# Patient Record
Sex: FEMALE | Race: BLACK OR AFRICAN AMERICAN | NOT HISPANIC OR LATINO | Employment: UNEMPLOYED | URBAN - METROPOLITAN AREA
[De-identification: names, ages, dates, MRNs, and addresses within clinical notes are randomized per-mention and may not be internally consistent; named-entity substitution may affect disease eponyms.]

---

## 2017-05-30 PROBLEM — R29.898 UPPER EXTREMITY WEAKNESS: Status: ACTIVE | Noted: 2017-05-30

## 2018-12-10 PROBLEM — K59.04 CHRONIC IDIOPATHIC CONSTIPATION: Status: ACTIVE | Noted: 2018-12-10

## 2020-12-31 PROBLEM — E11.9 TYPE 2 DIABETES MELLITUS WITHOUT COMPLICATION, WITHOUT LONG-TERM CURRENT USE OF INSULIN: Status: ACTIVE | Noted: 2020-12-31

## 2021-03-29 PROBLEM — Z86.010 PERSONAL HISTORY OF COLONIC POLYPS: Status: ACTIVE | Noted: 2021-03-29

## 2021-03-29 PROBLEM — Z86.0100 PERSONAL HISTORY OF COLONIC POLYPS: Status: ACTIVE | Noted: 2021-03-29

## 2023-12-28 ENCOUNTER — PATIENT MESSAGE (OUTPATIENT)
Dept: FAMILY MEDICINE | Facility: CLINIC | Age: 63
End: 2023-12-28
Payer: COMMERCIAL

## 2023-12-28 DIAGNOSIS — G56.02 CARPAL TUNNEL SYNDROME OF LEFT WRIST: Primary | ICD-10-CM

## 2023-12-28 DIAGNOSIS — G56.01 CARPAL TUNNEL SYNDROME OF RIGHT WRIST: ICD-10-CM

## 2024-01-16 ENCOUNTER — OFFICE VISIT (OUTPATIENT)
Dept: FAMILY MEDICINE | Facility: CLINIC | Age: 64
End: 2024-01-16
Payer: COMMERCIAL

## 2024-01-16 DIAGNOSIS — Z91.09 ENVIRONMENTAL ALLERGIES: ICD-10-CM

## 2024-01-16 DIAGNOSIS — E78.2 MIXED HYPERLIPIDEMIA: ICD-10-CM

## 2024-01-16 DIAGNOSIS — J32.9 RECURRENT SINUSITIS: ICD-10-CM

## 2024-01-16 DIAGNOSIS — K59.04 CHRONIC IDIOPATHIC CONSTIPATION: ICD-10-CM

## 2024-01-16 DIAGNOSIS — E11.65 TYPE 2 DIABETES MELLITUS WITH HYPERGLYCEMIA, WITHOUT LONG-TERM CURRENT USE OF INSULIN: Primary | ICD-10-CM

## 2024-01-16 DIAGNOSIS — R60.0 BILATERAL LOWER EXTREMITY EDEMA: ICD-10-CM

## 2024-01-16 DIAGNOSIS — I10 ESSENTIAL HYPERTENSION: ICD-10-CM

## 2024-01-16 DIAGNOSIS — R22.1 LOCALIZED SWELLING, MASS OR LUMP OF NECK: ICD-10-CM

## 2024-01-16 PROCEDURE — 3008F BODY MASS INDEX DOCD: CPT | Mod: CPTII,95,, | Performed by: NURSE PRACTITIONER

## 2024-01-16 PROCEDURE — 1159F MED LIST DOCD IN RCRD: CPT | Mod: CPTII,95,, | Performed by: NURSE PRACTITIONER

## 2024-01-16 PROCEDURE — 99214 OFFICE O/P EST MOD 30 MIN: CPT | Mod: 95,,, | Performed by: NURSE PRACTITIONER

## 2024-01-16 PROCEDURE — 1160F RVW MEDS BY RX/DR IN RCRD: CPT | Mod: CPTII,95,, | Performed by: NURSE PRACTITIONER

## 2024-01-16 RX ORDER — TIRZEPATIDE 5 MG/.5ML
5 INJECTION, SOLUTION SUBCUTANEOUS
Qty: 4 PEN | Refills: 2 | Status: SHIPPED | OUTPATIENT
Start: 2024-01-16 | End: 2024-04-15

## 2024-01-16 RX ORDER — AMOXICILLIN AND CLAVULANATE POTASSIUM 875; 125 MG/1; MG/1
1 TABLET, FILM COATED ORAL EVERY 12 HOURS
Qty: 14 TABLET | Refills: 0 | Status: SHIPPED | OUTPATIENT
Start: 2024-01-16 | End: 2024-04-15 | Stop reason: ALTCHOICE

## 2024-01-16 RX ORDER — HYDROCHLOROTHIAZIDE 25 MG/1
25 TABLET ORAL DAILY
Qty: 90 TABLET | Refills: 1 | Status: SHIPPED | OUTPATIENT
Start: 2024-01-16

## 2024-01-16 RX ORDER — FLUTICASONE PROPIONATE 50 MCG
SPRAY, SUSPENSION (ML) NASAL
Qty: 16 G | Refills: 5 | Status: SHIPPED | OUTPATIENT
Start: 2024-01-16

## 2024-01-16 RX ORDER — PREDNISONE 20 MG/1
TABLET ORAL
Qty: 13 TABLET | Refills: 0 | Status: SHIPPED | OUTPATIENT
Start: 2024-01-16 | End: 2024-01-26

## 2024-01-16 RX ORDER — AZELASTINE 1 MG/ML
1 SPRAY, METERED NASAL 2 TIMES DAILY
Qty: 30 ML | Refills: 5 | Status: SHIPPED | OUTPATIENT
Start: 2024-01-16

## 2024-01-18 VITALS — WEIGHT: 247 LBS | BODY MASS INDEX: 41.1 KG/M2

## 2024-01-18 NOTE — PROGRESS NOTES
SUBJECTIVE:      Patient ID: Abril Ontiveros is a 63 y.o. female.    Chief Complaint: No chief complaint on file.    Presents via telemed visit for weight recheck & some other concerns - states she has been trying to exercise regularly but was sick through the holidays and hasn't gotten back on track. She is doing well with phentermine for appetite suppression.    Discussed outstanding health maintenance    Diabetes  She presents for her follow-up diabetic visit. She has type 2 diabetes mellitus. No MedicAlert identification noted. The initial diagnosis of diabetes was made 2 years ago. Her disease course has been fluctuating. Hypoglycemia symptoms include dizziness (feels this is sinus related). Pertinent negatives for hypoglycemia include no confusion, headaches, nervousness/anxiousness or pallor. Associated symptoms include fatigue and weakness (extremities). Pertinent negatives for diabetes include no chest pain, no polydipsia and no polyuria. There are no hypoglycemic complications. Symptoms are stable. There are no diabetic complications. Risk factors for coronary artery disease include diabetes mellitus, dyslipidemia, hypertension, obesity, post-menopausal, sedentary lifestyle, family history and stress. Current diabetic treatment includes diet (Mounjaro). She is compliant with treatment all of the time. Her weight is decreasing steadily. She is following a generally healthy diet. Meal planning includes calorie counting and avoidance of concentrated sweets. She has not had a previous visit with a dietitian. She rarely participates in exercise. An ACE inhibitor/angiotensin II receptor blocker is not being taken. She does not see a podiatrist.Eye exam is current.   Hypertension  This is a chronic problem. The current episode started more than 1 month ago. The problem has been gradually improving since onset. The problem is controlled. Associated symptoms include peripheral edema. Pertinent negatives  include no chest pain, headaches, neck pain, palpitations or shortness of breath. Risk factors for coronary artery disease include diabetes mellitus, dyslipidemia, obesity, post-menopausal state, sedentary lifestyle and stress. Past treatments include diuretics. The current treatment provides moderate improvement. Compliance problems include exercise and diet.  Identifiable causes of hypertension include a thyroid problem.   Thyroid Problem  Presents for follow-up visit. Symptoms include constipation, fatigue and hoarse voice. Patient reports no anxiety, cold intolerance, diarrhea, heat intolerance, menstrual problem or palpitations. The symptoms have been stable. Her past medical history is significant for diabetes and hyperlipidemia.   Hyperlipidemia  This is a chronic problem. The current episode started more than 1 year ago. Exacerbating diseases include diabetes, hypothyroidism and obesity. Factors aggravating her hyperlipidemia include fatty foods and thiazides. Pertinent negatives include no chest pain, myalgias or shortness of breath. Current antihyperlipidemic treatment includes diet change and exercise. Compliance problems include adherence to diet and adherence to exercise.  Risk factors for coronary artery disease include diabetes mellitus, dyslipidemia, obesity, a sedentary lifestyle, post-menopausal, family history, hypertension and stress.       Past Surgical History:   Procedure Laterality Date     SECTION      x 2,  &     COLONOSCOPY N/A 3/29/2021    Procedure: COLONOSCOPY;  Surgeon: Jhoan Mendoza MD;  Location: [facility];  Service: Endoscopy;  Laterality: N/A;    CYST REMOVAL      chest area, left and right underarms    HYSTERECTOMY      OOPHORECTOMY       Family History   Problem Relation Age of Onset    Diabetes Mother     Cancer Mother         unknown  type. Hysterectomy.    Thyroid disease Sister     Cancer Father         throat cancer. Alcohol abuse, tobacco use.     Alcohol abuse Father     COPD Brother       Social History     Socioeconomic History    Marital status:     Number of children: 2   Occupational History     Employer: [employer]   Tobacco Use    Smoking status: Never    Smokeless tobacco: Never   Substance and Sexual Activity    Alcohol use: No    Drug use: No     Social Determinants of Health     Financial Resource Strain: Medium Risk (1/12/2024)    Overall Financial Resource Strain (CARDIA)     Difficulty of Paying Living Expenses: Somewhat hard   Food Insecurity: No Food Insecurity (1/12/2024)    Hunger Vital Sign     Worried About Running Out of Food in the Last Year: Never true     Ran Out of Food in the Last Year: Never true   Transportation Needs: No Transportation Needs (1/12/2024)    PRAPARE - Transportation     Lack of Transportation (Medical): No     Lack of Transportation (Non-Medical): No   Physical Activity: Insufficiently Active (1/12/2024)    Exercise Vital Sign     Days of Exercise per Week: 3 days     Minutes of Exercise per Session: 30 min   Stress: Patient Declined (1/12/2024)    [employer] - Occupational Stress Questionnaire     Feeling of Stress : Patient declined   Social Connections: Unknown (1/12/2024)    Social Connection and Isolation Panel [NHANES]     Frequency of Communication with Friends and Family: Twice a week     Frequency of Social Gatherings with Friends and Family: Patient declined     Active Member of Clubs or Organizations: Yes     Attends Club or Organization Meetings: More than 4 times per year     Marital Status:    Housing Stability: Low Risk  (1/12/2024)    Housing Stability Vital Sign     Unable to Pay for Housing in the Last Year: No     Number of Places Lived in the Last Year: 2     Unstable Housing in the Last Year: No     Current Outpatient Medications   Medication Sig Dispense Refill    albuterol (PROVENTIL/VENTOLIN HFA) 90 mcg/actuation inhaler INHALE 2 PUFFS BY MOUTH INTO  THE LUNGS EVERY 6 HOURS AS NEEDED FOR WHEEZING, RESCUE 18 g 0    amoxicillin-clavulanate 875-125mg (AUGMENTIN) 875-125 mg per tablet Take 1 tablet by mouth every 12 (twelve) hours. 14 tablet 0    azelastine (ASTELIN) 137 mcg (0.1 %) nasal spray 1 spray (137 mcg total) by Nasal route 2 (two) times daily. 30 mL 5    doxycycline (VIBRA-TABS) 100 MG tablet Take 1 tablet (100 mg total) by mouth once daily. 90 tablet 0    fluticasone propionate (FLONASE) 50 mcg/actuation nasal spray SHAKE LIQUID AND USE 1 SPRAY IN EACH NOSTRIL TWICE DAILY 16 g 5    furosemide (LASIX) 20 MG tablet TAKE 1 TABLET(20 MG) BY MOUTH EVERY DAY (Patient taking differently: daily as needed.) 30 tablet 0    hydroCHLOROthiazide (HYDRODIURIL) 25 MG tablet Take 1 tablet (25 mg total) by mouth once daily. 90 tablet 1    hydrocortisone 2.5 % cream Apply topically 2 (two) times daily. 28 g 0    linaCLOtide (LINZESS) 290 mcg Cap capsule Take 1 capsule (290 mcg total) by mouth once daily. 90 capsule 1    mupirocin (BACTROBAN) 2 % ointment Apply topically 3 (three) times daily. To nares 15 g 0    mv-mn/folic acid/vit [id number] (ALIVE ONCE DAILY WOMEN 50 PLUS ORAL) Take 1 tablet by mouth once daily.      phentermine (ADIPEX-P) 37.5 mg tablet Take 1 tablet (37.5 mg total) by mouth once daily. 90 tablet 0    predniSONE (DELTASONE) 20 MG tablet Take 2 tablets (40 mg total) by mouth once daily for 4 days, THEN 1 tablet (20 mg total) once daily for 4 days, THEN 0.5 tablets (10 mg total) once daily for 2 days. 13 tablet 0    tirzepatide (MOUNJARO) 5 mg/0.5 mL PnIj Inject 5 mg into the skin every 7 days. 4 Pen 2     No current facility-administered medications for this visit.     Review of patient's allergies indicates:   Allergen Reactions    Bactrim [sulfamethoxazole-trimethoprim] Nausea Only     Bad nausea and stomach cramps    Sulfa (sulfonamide antibiotics) Other (See Comments)     Severe cramping      Past Medical History:   Diagnosis Date    Allergic rhinitis      Bronchitis 2020    Chronic sinusitis     Constipation     Hidradenitis suppurativa     Hyperlipidemia     Hypertension     Meniere disease     Type 2 diabetes mellitus without complication, without long-term current use of insulin 2020    Type 2 diabetes mellitus without retinopathy 2023    EYE EXAM IN MEDIA     Past Surgical History:   Procedure Laterality Date     SECTION      x 2,  &     COLONOSCOPY N/A 3/29/2021    Procedure: COLONOSCOPY;  Surgeon: Jhoan Mendoza MD;  Location: [facility];  Service: Endoscopy;  Laterality: N/A;    CYST REMOVAL      chest area, left and right underarms    HYSTERECTOMY  1983    OOPHORECTOMY         Review of Systems   Constitutional:  Positive for fatigue. Negative for activity change, appetite change and unexpected weight change.   HENT:  Positive for congestion, ear pain, hoarse voice, postnasal drip, sinus pressure, sinus pain and sore throat. Negative for drooling, facial swelling, hearing loss, rhinorrhea, sneezing and trouble swallowing.         Saw Dr. Hdz & was referred to allergist (can't recall name though)   Eyes:  Negative for photophobia, pain, discharge and visual disturbance.   Respiratory:  Negative for cough, chest tightness, shortness of breath and wheezing.    Cardiovascular:  Positive for leg swelling. Negative for chest pain and palpitations.   Gastrointestinal:  Positive for constipation. Negative for abdominal distention, abdominal pain, anal bleeding, blood in stool, diarrhea, nausea and vomiting.   Endocrine: Negative for cold intolerance, heat intolerance, polydipsia and polyuria.   Genitourinary:  Negative for difficulty urinating, dysuria, flank pain, frequency, hematuria, menstrual problem, pelvic pain, urgency and vaginal pain.   Musculoskeletal:  Positive for arthralgias. Negative for back pain, joint swelling, myalgias and neck pain.   Skin:  Negative for pallor and rash.        States she still has a  lump on her neck that changes in size   Allergic/Immunologic: Positive for environmental allergies. Negative for food allergies.   Neurological:  Positive for dizziness (feels this is sinus related), weakness (extremities) and numbness (hands, feet). Negative for light-headedness and headaches.   Hematological:  Does not bruise/bleed easily.   Psychiatric/Behavioral:  Negative for agitation, confusion, decreased concentration, dysphoric mood and sleep disturbance. The patient is not nervous/anxious.       OBJECTIVE:      Vitals:    01/16/24 1200   Weight: 112 kg (247 lb)       Physical Exam  Constitutional:       General: She is not in acute distress.     Appearance: Normal appearance. She is well-developed. She is obese.   HENT:      Head: Normocephalic and atraumatic.      Nose: Nose normal.   Eyes:      General: Lids are normal.   Neck:      Trachea: No tracheal deviation.   Pulmonary:      Effort: Pulmonary effort is normal. No respiratory distress.   Musculoskeletal:      Cervical back: Normal range of motion.   Skin:     Coloration: Skin is not pale.   Neurological:      Mental Status: She is alert and oriented to person, place, and time.      GCS: GCS eye subscore is 4. GCS verbal subscore is 5. GCS motor subscore is 6.   Psychiatric:         Attention and Perception: Attention and perception normal.         Mood and Affect: Mood and affect normal.         Speech: Speech normal.         Behavior: Behavior normal.         Thought Content: Thought content normal.         Cognition and Memory: Cognition and memory normal.        Assessment:       1. Type 2 diabetes mellitus with hyperglycemia, without long-term current use of insulin    2. Essential hypertension    3. Recurrent sinusitis    4. Chronic idiopathic constipation    5. Localized swelling, mass or lump of neck    6. Bilateral lower extremity edema    7. Environmental allergies    8. Mixed hyperlipidemia        Plan:       Type 2 diabetes mellitus with  hyperglycemia, without long-term current use of insulin  -     CBC Auto Differential; Future; Expected date: 01/23/2024  -     Comprehensive Metabolic Panel; Future; Expected date: 01/16/2024  -     Lipid Panel; Future; Expected date: 01/16/2024  -     TSH; Future; Expected date: 01/16/2024  -     Hemoglobin A1C; Future; Expected date: 01/16/2024  -     Microalbumin/Creatinine Ratio, Urine; Future; Expected date: 01/16/2024  -     T4, Free; Future; Expected date: 01/16/2024  -     tirzepatide (MOUNJARO) 5 mg/0.5 mL PnIj; Inject 5 mg into the skin every 7 days.  Dispense: 4 Pen; Refill: 2    Essential hypertension  -     CBC Auto Differential; Future; Expected date: 01/23/2024  -     Comprehensive Metabolic Panel; Future; Expected date: 01/16/2024  -     Lipid Panel; Future; Expected date: 01/16/2024  -     TSH; Future; Expected date: 01/16/2024    Recurrent sinusitis  -     CT Sinuses without Contrast; Future; Expected date: 01/16/2024  -     CBC Auto Differential; Future; Expected date: 01/23/2024  -     amoxicillin-clavulanate 875-125mg (AUGMENTIN) 875-125 mg per tablet; Take 1 tablet by mouth every 12 (twelve) hours.  Dispense: 14 tablet; Refill: 0  -     predniSONE (DELTASONE) 20 MG tablet; Take 2 tablets (40 mg total) by mouth once daily for 4 days, THEN 1 tablet (20 mg total) once daily for 4 days, THEN 0.5 tablets (10 mg total) once daily for 2 days.  Dispense: 13 tablet; Refill: 0    Chronic idiopathic constipation  -     linaCLOtide (LINZESS) 290 mcg Cap capsule; Take 1 capsule (290 mcg total) by mouth once daily.  Dispense: 90 capsule; Refill: 1    Localized swelling, mass or lump of neck  -     US Soft Tissue Head Neck Thyroid; Future; Expected date: 01/16/2024  -     CBC Auto Differential; Future; Expected date: 01/23/2024  -     TSH; Future; Expected date: 01/16/2024  -     T4, Free; Future; Expected date: 01/16/2024    Bilateral lower extremity edema  -     hydroCHLOROthiazide (HYDRODIURIL) 25 MG  tablet; Take 1 tablet (25 mg total) by mouth once daily.  Dispense: 90 tablet; Refill: 1  -     Comprehensive Metabolic Panel; Future; Expected date: 01/16/2024    Environmental allergies  -     fluticasone propionate (FLONASE) 50 mcg/actuation nasal spray; SHAKE LIQUID AND USE 1 SPRAY IN EACH NOSTRIL TWICE DAILY  Dispense: 16 g; Refill: 5  -     azelastine (ASTELIN) 137 mcg (0.1 %) nasal spray; 1 spray (137 mcg total) by Nasal route 2 (two) times daily.  Dispense: 30 mL; Refill: 5    Mixed hyperlipidemia  -     Comprehensive Metabolic Panel; Future; Expected date: 01/16/2024  -     Lipid Panel; Future; Expected date: 01/16/2024  -     TSH; Future; Expected date: 01/16/2024                    Follow up in about 6 months (around 7/16/2024) for well exam & lab review.      1/18/2024 AARON Arreguin, FNP-C

## 2024-01-30 ENCOUNTER — HOSPITAL ENCOUNTER (OUTPATIENT)
Dept: RADIOLOGY | Facility: HOSPITAL | Age: 64
Discharge: HOME | End: 2024-01-30
Attending: NURSE PRACTITIONER
Payer: MEDICARE

## 2024-01-30 DIAGNOSIS — J32.9 RECURRENT SINUSITIS: ICD-10-CM

## 2024-01-30 DIAGNOSIS — R22.1 LOCALIZED SWELLING, MASS OR LUMP OF NECK: ICD-10-CM

## 2024-01-30 PROCEDURE — 70486 CT MAXILLOFACIAL W/O DYE: CPT | Mod: TC,PO

## 2024-01-30 PROCEDURE — 76536 US EXAM OF HEAD AND NECK: CPT | Mod: TC,PO

## 2024-02-12 ENCOUNTER — PATIENT MESSAGE (OUTPATIENT)
Dept: FAMILY MEDICINE | Facility: CLINIC | Age: 64
End: 2024-02-12
Payer: COMMERCIAL

## 2024-03-05 DIAGNOSIS — E04.1 LEFT THYROID NODULE: Primary | ICD-10-CM

## 2024-03-20 DIAGNOSIS — J34.89 SORE IN NOSE: ICD-10-CM

## 2024-03-20 DIAGNOSIS — K64.9 HEMORRHOIDS, UNSPECIFIED HEMORRHOID TYPE: ICD-10-CM

## 2024-03-21 RX ORDER — HYDROCORTISONE 25 MG/G
CREAM TOPICAL 2 TIMES DAILY
Qty: 28 G | Refills: 0 | Status: SHIPPED | OUTPATIENT
Start: 2024-03-21

## 2024-03-21 RX ORDER — MUPIROCIN 20 MG/G
OINTMENT TOPICAL 3 TIMES DAILY
Qty: 15 G | Refills: 0 | Status: SHIPPED | OUTPATIENT
Start: 2024-03-21 | End: 2024-06-11 | Stop reason: ALTCHOICE

## 2024-03-21 NOTE — TELEPHONE ENCOUNTER
Patient last seen: 01/16/24  Scheduled to be seen: 04/15/24  Medication last filled: 10/10/23    Medication pended

## 2024-03-26 ENCOUNTER — OFFICE VISIT (OUTPATIENT)
Dept: SURGERY | Facility: CLINIC | Age: 64
End: 2024-03-26
Payer: COMMERCIAL

## 2024-03-26 VITALS — DIASTOLIC BLOOD PRESSURE: 89 MMHG | SYSTOLIC BLOOD PRESSURE: 133 MMHG | TEMPERATURE: 99 F

## 2024-03-26 DIAGNOSIS — E04.1 LEFT THYROID NODULE: Primary | ICD-10-CM

## 2024-03-26 DIAGNOSIS — E04.2 MULTINODULAR GOITER: ICD-10-CM

## 2024-03-26 PROCEDURE — 3075F SYST BP GE 130 - 139MM HG: CPT | Mod: CPTII,S$GLB,, | Performed by: SURGERY

## 2024-03-26 PROCEDURE — 1160F RVW MEDS BY RX/DR IN RCRD: CPT | Mod: CPTII,S$GLB,, | Performed by: SURGERY

## 2024-03-26 PROCEDURE — 99999 PR PBB SHADOW E&M-EST. PATIENT-LVL III: CPT | Mod: PBBFAC,,, | Performed by: SURGERY

## 2024-03-26 PROCEDURE — 1159F MED LIST DOCD IN RCRD: CPT | Mod: CPTII,S$GLB,, | Performed by: SURGERY

## 2024-03-26 PROCEDURE — 99204 OFFICE O/P NEW MOD 45 MIN: CPT | Mod: S$GLB,,, | Performed by: SURGERY

## 2024-03-26 PROCEDURE — 3079F DIAST BP 80-89 MM HG: CPT | Mod: CPTII,S$GLB,, | Performed by: SURGERY

## 2024-03-26 NOTE — PROGRESS NOTES
Subjective:       Patient ID: Abril Ontiveros is a 63 y.o. female.    Chief Complaint: Other (Thyroid nodule)      HPI:  63 year old female referred to the office with large left thyroid nodule. Visible externally. Has left neck tenderness. Nodule nearly 6 cm in the left. She has a 2.2 cm nodule on the right. No history of radiation.     US FINDINGS:  Right thyroid lobe measures 50 x 20 x 20 mm, and left thyroid lobe measures 79 x 34 x 29 mm. Thyroid isthmus thickness is 14 mm.     6 mm hypoechoic nodule lies in superior right thyroid lobe.  8 mm hypoechoic solid nodule in mid thyroid lobe posteriorly contains punctate echogenic foci.  Hypoechoic nodule in right thyroid lobe superiorly and medially measures 7 mm.  Mid right thyroid lobe solid hypoechoic nodule measures 7 mm.  Inferior right thyroid lobe solid hypoechoic nodule measures 10 mm.  Inferior right thyroid lobe hypoechoic solid nodule measures 22 x 16 x 12 mm.     Solid heterogeneous nodule in left thyroid lobe measures 58 x 44 x 38 mm.     Targeted ultrasound in left neck, at site of reported abnormality, shows 3 oh hypoechoic foci measuring less than 10 mm short axis diameter size, likely reflecting lymph nodes. Evaluation of right neck also shows hypoechoic focus measuring 3 mm in short axis diameter are felt to represent a lymph node.     IMPRESSION:     1. Multifocal bilateral thyroid nodules including 58 mm solid nodule in left thyroid lobe. This represents a TI-RADS 5 lesion and, given size, ultrasound-guided FNA is recommended.  2. In the neck bilaterally, oval hypoechoic foci suggesting normal size cervical lymph nodes are present. Clinical correlation is requested to document stability or resolution. If any progression occurs, further evaluation with CT soft tissue neck with IV contrast is recommended.       Past Medical History:   Diagnosis Date    Allergic rhinitis     Bronchitis 12/02/2020    Chronic sinusitis     Constipation      Hidradenitis suppurativa     Hyperlipidemia     Hypertension     Meniere disease     Type 2 diabetes mellitus without complication, without long-term current use of insulin 2020    Type 2 diabetes mellitus without retinopathy 2023    EYE EXAM IN MEDIA     Past Surgical History:   Procedure Laterality Date     SECTION      x 2,  &     COLONOSCOPY N/A 3/29/2021    Procedure: COLONOSCOPY;  Surgeon: Jhoan Mendoza MD;  Location: [facility];  Service: Endoscopy;  Laterality: N/A;    CYST REMOVAL      chest area, left and right underarms    HYSTERECTOMY  1983    OOPHORECTOMY       Review of patient's allergies indicates:   Allergen Reactions    Bactrim [sulfamethoxazole-trimethoprim] Nausea Only     Bad nausea and stomach cramps    Sulfa (sulfonamide antibiotics) Other (See Comments)     Severe cramping     Medication List with Changes/Refills   Current Medications    ALBUTEROL (PROVENTIL/VENTOLIN HFA) 90 MCG/ACTUATION INHALER    INHALE 2 PUFFS BY MOUTH INTO THE LUNGS EVERY 6 HOURS AS NEEDED FOR WHEEZING, RESCUE    AMOXICILLIN-CLAVULANATE 875-125MG (AUGMENTIN) 875-125 MG PER TABLET    Take 1 tablet by mouth every 12 (twelve) hours.    AZELASTINE (ASTELIN) 137 MCG (0.1 %) NASAL SPRAY    1 spray (137 mcg total) by Nasal route 2 (two) times daily.    DOXYCYCLINE (VIBRA-TABS) 100 MG TABLET    Take 1 tablet (100 mg total) by mouth once daily.    FLUTICASONE PROPIONATE (FLONASE) 50 MCG/ACTUATION NASAL SPRAY    SHAKE LIQUID AND USE 1 SPRAY IN EACH NOSTRIL TWICE DAILY    FUROSEMIDE (LASIX) 20 MG TABLET    TAKE 1 TABLET(20 MG) BY MOUTH EVERY DAY    HYDROCHLOROTHIAZIDE (HYDRODIURIL) 25 MG TABLET    Take 1 tablet (25 mg total) by mouth once daily.    HYDROCORTISONE 2.5 % CREAM    Apply topically 2 (two) times daily.    LINACLOTIDE (LINZESS) 290 MCG CAP CAPSULE    Take 1 capsule (290 mcg total) by mouth once daily.    MUPIROCIN (BACTROBAN) 2 % OINTMENT    Apply topically 3 (three) times  daily. To margarito    MV-MN/FOLIC ACID/VIT [id number] (ALIVE ONCE DAILY WOMEN 50 PLUS ORAL)    Take 1 tablet by mouth once daily.    PHENTERMINE (ADIPEX-P) 37.5 MG TABLET    Take 1 tablet (37.5 mg total) by mouth once daily.    TIRZEPATIDE (MOUNJARO) 5 MG/0.5 ML PNIJ    Inject 5 mg into the skin every 7 days.     Family History   Problem Relation Age of Onset    Diabetes Mother     Cancer Mother         unknown  type. Hysterectomy.    Thyroid disease Sister     Cancer Father         throat cancer. Alcohol abuse, tobacco use.    Alcohol abuse Father     COPD Brother      Social History     Socioeconomic History    Marital status:     Number of children: 2   Occupational History     Employer: [employer]   Tobacco Use    Smoking status: Never    Smokeless tobacco: Never   Substance and Sexual Activity    Alcohol use: No    Drug use: No     Social Determinants of Health     Financial Resource Strain: Medium Risk (1/12/2024)    Overall Financial Resource Strain (CARDIA)     Difficulty of Paying Living Expenses: Somewhat hard   Food Insecurity: No Food Insecurity (1/12/2024)    Hunger Vital Sign     Worried About Running Out of Food in the Last Year: Never true     Ran Out of Food in the Last Year: Never true   Transportation Needs: No Transportation Needs (1/12/2024)    PRAPARE - Transportation     Lack of Transportation (Medical): No     Lack of Transportation (Non-Medical): No   Physical Activity: Insufficiently Active (1/12/2024)    Exercise Vital Sign     Days of Exercise per Week: 3 days     Minutes of Exercise per Session: 30 min   Stress: Patient Declined (1/12/2024)    [employer] - Occupational Stress Questionnaire     Feeling of Stress : Patient declined   Social Connections: Unknown (1/12/2024)    Social Connection and Isolation Panel [NHANES]     Frequency of Communication with Friends and Family: Twice a week     Frequency of Social Gatherings with Friends  and Family: Patient declined     Active Member of Clubs or Organizations: Yes     Attends Club or Organization Meetings: More than 4 times per year     Marital Status:    Housing Stability: Low Risk  (1/12/2024)    Housing Stability Vital Sign     Unable to Pay for Housing in the Last Year: No     Number of Places Lived in the Last Year: 2     Unstable Housing in the Last Year: No         Review of Systems   Constitutional:  Negative for appetite change, chills, fever and unexpected weight change.   HENT:  Positive for sore throat. Negative for hearing loss, rhinorrhea and voice change.    Eyes:  Negative for photophobia and visual disturbance.   Respiratory:  Negative for cough, choking and shortness of breath.    Cardiovascular:  Negative for chest pain, palpitations and leg swelling.   Gastrointestinal:  Negative for abdominal pain, blood in stool, constipation, diarrhea, nausea and vomiting.   Endocrine: Negative for cold intolerance, heat intolerance, polydipsia and polyuria.   Musculoskeletal:  Negative for arthralgias, back pain, joint swelling and neck stiffness.   Skin:  Negative for color change, pallor and rash.   Neurological:  Negative for dizziness, seizures, syncope and headaches.   Hematological:  Negative for adenopathy. Does not bruise/bleed easily.   Psychiatric/Behavioral:  Negative for agitation, behavioral problems and confusion.      Objective:      Physical Exam  Constitutional:       General: She is awake. She is not in acute distress.     Appearance: She is not toxic-appearing.   HENT:      Head: Normocephalic and atraumatic.   Neck:      Thyroid: Thyroid mass, thyromegaly and thyroid tenderness present.     Pulmonary:      Effort: Pulmonary effort is normal. No tachypnea, bradypnea, accessory muscle usage or respiratory distress.   Abdominal:      General: There is no distension.      Palpations: Abdomen is soft.      Tenderness: There is no abdominal tenderness.    Lymphadenopathy:      Cervical: No cervical adenopathy.      Right cervical: No superficial or deep cervical adenopathy.     Left cervical: No superficial or deep cervical adenopathy.   Neurological:      Mental Status: She is alert and oriented to person, place, and time.   Psychiatric:         Behavior: Behavior is cooperative.       Assessment/Plan:   Left thyroid nodule  -     Ambulatory referral/consult to General Surgery  -     US FNA Thyroid, 1st Lesion; Future; Expected date: 03/26/2024    Multinodular goiter  -     US FNA Thyroid Ea Addl Lesion; Future; Expected date: 03/26/2024    Will refer for FNA of the large left nodule the 2.2 cm right thyroid nodule. Plan for left thyroid lobectomy as it is large, tender. Possible total thyroidectomy if left nodule is suspicious and/or right nodule is suspicious.     Follow up after FNA

## 2024-03-27 ENCOUNTER — PATIENT MESSAGE (OUTPATIENT)
Dept: SURGERY | Facility: CLINIC | Age: 64
End: 2024-03-27
Payer: COMMERCIAL

## 2024-04-03 ENCOUNTER — TELEPHONE (OUTPATIENT)
Dept: RADIOLOGY | Facility: HOSPITAL | Age: 64
End: 2024-04-03

## 2024-04-03 NOTE — NURSING
FNA mahin thyroid scheduled @ [facility] main on 4/11 @ 1pm with arrival @ 1230. Pre-procedure instructions given and understanding verbalized.

## 2024-04-12 ENCOUNTER — HOSPITAL ENCOUNTER (OUTPATIENT)
Dept: RADIOLOGY | Facility: HOSPITAL | Age: 64
Discharge: HOME | End: 2024-04-12
Attending: SURGERY
Payer: COMMERCIAL

## 2024-04-12 DIAGNOSIS — E04.2 MULTINODULAR GOITER: ICD-10-CM

## 2024-04-12 DIAGNOSIS — E04.1 LEFT THYROID NODULE: ICD-10-CM

## 2024-04-12 PROCEDURE — 10006 FNA BX W/US GDN EA ADDL: CPT | Mod: ,,, | Performed by: RADIOLOGY

## 2024-04-12 PROCEDURE — 10005 FNA BX W/US GDN 1ST LES: CPT

## 2024-04-12 PROCEDURE — 25000003 PHARM REV CODE 250: Performed by: RADIOLOGY

## 2024-04-12 PROCEDURE — 10006 FNA BX W/US GDN EA ADDL: CPT

## 2024-04-12 PROCEDURE — 27000342 US FINE NEEDLE ASPIRATION THYROID, FIRST LESION

## 2024-04-12 PROCEDURE — 88173 CYTOPATH EVAL FNA REPORT: CPT | Performed by: SURGERY

## 2024-04-12 PROCEDURE — 10005 FNA BX W/US GDN 1ST LES: CPT | Mod: ,,, | Performed by: RADIOLOGY

## 2024-04-12 RX ORDER — LIDOCAINE HYDROCHLORIDE 10 MG/ML
INJECTION, SOLUTION EPIDURAL; INFILTRATION; INTRACAUDAL; PERINEURAL
Status: COMPLETED | OUTPATIENT
Start: 2024-04-12 | End: 2024-04-12

## 2024-04-12 RX ADMIN — LIDOCAINE HYDROCHLORIDE 15 ML: 10 INJECTION, SOLUTION EPIDURAL; INFILTRATION; INTRACAUDAL; PERINEURAL at 09:04

## 2024-04-15 ENCOUNTER — OFFICE VISIT (OUTPATIENT)
Dept: FAMILY MEDICINE | Facility: CLINIC | Age: 64
End: 2024-04-15
Payer: COMMERCIAL

## 2024-04-15 ENCOUNTER — PATIENT MESSAGE (OUTPATIENT)
Dept: FAMILY MEDICINE | Facility: CLINIC | Age: 64
End: 2024-04-15

## 2024-04-15 DIAGNOSIS — L73.2 HYDRADENITIS: ICD-10-CM

## 2024-04-15 DIAGNOSIS — E11.65 TYPE 2 DIABETES MELLITUS WITH HYPERGLYCEMIA, WITHOUT LONG-TERM CURRENT USE OF INSULIN: ICD-10-CM

## 2024-04-15 LAB
CYTOLOGY TISS FNA DOC CYTO: NORMAL
CYTOLOGY TISS FNA DOC CYTO: NORMAL
DX ICD CODE: NORMAL
DX ICD CODE: NORMAL
FNA PERFORMED BY: NORMAL
FNA PERFORMED BY: NORMAL
PATH REPORT.SITE OF ORIGIN SPEC: NORMAL
PATH REPORT.SITE OF ORIGIN SPEC: NORMAL
PATHOLOGIST NAME: NORMAL
PATHOLOGIST NAME: NORMAL

## 2024-04-15 PROCEDURE — 1159F MED LIST DOCD IN RCRD: CPT | Mod: CPTII,95,, | Performed by: NURSE PRACTITIONER

## 2024-04-15 PROCEDURE — 1160F RVW MEDS BY RX/DR IN RCRD: CPT | Mod: CPTII,95,, | Performed by: NURSE PRACTITIONER

## 2024-04-15 PROCEDURE — 99214 OFFICE O/P EST MOD 30 MIN: CPT | Mod: 95,,, | Performed by: NURSE PRACTITIONER

## 2024-04-15 RX ORDER — DULAGLUTIDE 1.5 MG/.5ML
1.5 INJECTION, SOLUTION SUBCUTANEOUS
Qty: 4 PEN | Refills: 2 | Status: SHIPPED | OUTPATIENT
Start: 2024-04-15 | End: 2024-06-11

## 2024-04-17 RX ORDER — DOXYCYCLINE HYCLATE 100 MG
100 TABLET ORAL DAILY
Qty: 90 TABLET | Refills: 0 | Status: SHIPPED | OUTPATIENT
Start: 2024-04-17

## 2024-04-17 NOTE — PROGRESS NOTES
Subjective:        The chief complaint leading to consultation is: DM  The patient location is:  Home  Visit type: Virtual visit with synchronous audio/video or audio only  This was a video visit in lieu of in-person visit due to the coronavirus emergency. Patient acknowledged and consented to the video visit encounter.     Presents via telemed for DM    Diabetes  She presents for her follow-up diabetic visit. She has type 2 diabetes mellitus. No MedicAlert identification noted. Her disease course has been fluctuating. Hypoglycemia symptoms include confusion, dizziness, headaches and sleepiness. Pertinent negatives for hypoglycemia include no nervousness/anxiousness or pallor. Associated symptoms include fatigue and weakness. Pertinent negatives for diabetes include no chest pain, no polydipsia and no polyuria. Symptoms are stable. Risk factors for coronary artery disease include diabetes mellitus, obesity, sedentary lifestyle, stress, post-menopausal, dyslipidemia and hypertension. Current diabetic treatments: GLP-1. She is compliant with treatment most of the time. She never participates in exercise. An ACE inhibitor/angiotensin II receptor blocker is not being taken. She does not see a podiatrist.Eye exam is not current.       Past Surgical History:   Procedure Laterality Date     SECTION      x 1978 &     COLONOSCOPY N/A 3/29/2021    Procedure: COLONOSCOPY;  Surgeon: Jhoan Mendoza MD;  Location: [facility];  Service: Endoscopy;  Laterality: N/A;    CYST REMOVAL      chest area, left and right underarms    HYSTERECTOMY  1983    OOPHORECTOMY       Past Medical History:   Diagnosis Date    Allergic rhinitis     Bronchitis 2020    Chronic sinusitis     Constipation     Hidradenitis suppurativa     Hyperlipidemia     Hypertension     Meniere disease     Type 2 diabetes mellitus without complication, without long-term current use of insulin 2020    Type 2 diabetes mellitus  without retinopathy 09/01/2023    EYE EXAM IN MEDIA     Family History   Problem Relation Name Age of Onset    Diabetes Mother      Cancer Mother          unknown  type. Hysterectomy.    Cancer Father          throat cancer. Alcohol abuse, tobacco use.    Alcohol abuse Father      Hyperthyroidism Sister      COPD Brother      Thyroid disease Maternal Aunt          Social History:   Marital Status:   Alcohol History:  reports no history of alcohol use.  Tobacco History:  reports that she has never smoked. She has never used smokeless tobacco.  Drug History:  reports no history of drug use.    Review of patient's allergies indicates:   Allergen Reactions    Bactrim [sulfamethoxazole-trimethoprim] Nausea Only     Bad nausea and stomach cramps    Sulfa (sulfonamide antibiotics) Other (See Comments)     Severe cramping       Current Outpatient Medications   Medication Sig Dispense Refill    albuterol (PROVENTIL/VENTOLIN HFA) 90 mcg/actuation inhaler INHALE 2 PUFFS BY MOUTH INTO THE LUNGS EVERY 6 HOURS AS NEEDED FOR WHEEZING, RESCUE 18 g 0    azelastine (ASTELIN) 137 mcg (0.1 %) nasal spray 1 spray (137 mcg total) by Nasal route 2 (two) times daily. 30 mL 5    doxycycline (VIBRA-TABS) 100 MG tablet Take 1 tablet (100 mg total) by mouth once daily. 90 tablet 0    dulaglutide (TRULICITY) 1.5 mg/0.5 mL pen injector Inject 1.5 mg into the skin every 7 days. 4 pen 2    fluticasone propionate (FLONASE) 50 mcg/actuation nasal spray SHAKE LIQUID AND USE 1 SPRAY IN EACH NOSTRIL TWICE DAILY 16 g 5    furosemide (LASIX) 20 MG tablet TAKE 1 TABLET(20 MG) BY MOUTH EVERY DAY (Patient taking differently: daily as needed.) 30 tablet 0    hydroCHLOROthiazide (HYDRODIURIL) 25 MG tablet Take 1 tablet (25 mg total) by mouth once daily. 90 tablet 1    hydrocortisone 2.5 % cream Apply topically 2 (two) times daily. 28 g 0    linaCLOtide (LINZESS) 290 mcg Cap capsule Take 1 capsule (290 mcg total) by mouth once daily. 90 capsule 1     mupirocin (BACTROBAN) 2 % ointment Apply topically 3 (three) times daily. To nares 15 g 0    mv-mn/folic acid/vit [id number] (ALIVE ONCE DAILY WOMEN 50 PLUS ORAL) Take 1 tablet by mouth once daily.      phentermine (ADIPEX-P) 37.5 mg tablet Take 1 tablet (37.5 mg total) by mouth once daily. 90 tablet 0     No current facility-administered medications for this visit.       Review of Systems   Constitutional:  Positive for activity change, chills and fatigue. Negative for appetite change and unexpected weight change.   HENT:  Positive for congestion, postnasal drip, rhinorrhea, sinus pressure, trouble swallowing and voice change. Negative for ear pain, hearing loss, sinus pain, sneezing and sore throat.         Awaiting pathology from gen surg on several thyroid biopsies   Eyes:  Negative for photophobia, pain, discharge and visual disturbance.   Respiratory:  Positive for chest tightness. Negative for cough, shortness of breath and wheezing.    Cardiovascular:  Positive for palpitations. Negative for chest pain and leg swelling.   Gastrointestinal:  Positive for constipation. Negative for abdominal distention, abdominal pain, blood in stool, diarrhea, nausea and vomiting.   Endocrine: Positive for cold intolerance and heat intolerance. Negative for polydipsia and polyuria.   Genitourinary:  Negative for difficulty urinating, dysuria, flank pain, frequency, hematuria, menstrual problem, pelvic pain and urgency.   Musculoskeletal:  Positive for arthralgias, myalgias and neck pain. Negative for back pain and joint swelling.   Skin:  Negative for pallor.        HS   Allergic/Immunologic: Positive for environmental allergies. Negative for food allergies.   Neurological:  Positive for dizziness, weakness and headaches. Negative for light-headedness and numbness.        Following with neuro for BLE & BUE neuropathy   Hematological:  Does not bruise/bleed easily.   Psychiatric/Behavioral:  Positive for confusion. Negative for  agitation, decreased concentration, dysphoric mood and sleep disturbance. The patient is not nervous/anxious.          Objective:        Physical Exam:   Physical Exam  Constitutional:       General: She is not in acute distress.     Appearance: Normal appearance. She is well-developed. She is obese.   HENT:      Head: Normocephalic and atraumatic.      Nose: Nose normal.   Eyes:      General: Lids are normal.   Neck:      Trachea: No tracheal deviation.   Pulmonary:      Effort: Pulmonary effort is normal. No respiratory distress.   Musculoskeletal:      Cervical back: Normal range of motion.   Skin:     Coloration: Skin is not pale.   Neurological:      Mental Status: She is alert and oriented to person, place, and time.      GCS: GCS eye subscore is 4. GCS verbal subscore is 5. GCS motor subscore is 6.   Psychiatric:         Attention and Perception: Attention normal.         Mood and Affect: Mood normal.         Speech: Speech normal.         Behavior: Behavior normal.         Thought Content: Thought content normal.         Cognition and Memory: Cognition normal.              Assessment:       1. Type 2 diabetes mellitus with hyperglycemia, without long-term current use of insulin    2. Hydradenitis      Plan:   Type 2 diabetes mellitus with hyperglycemia, without long-term current use of insulin  -     dulaglutide (TRULICITY) 1.5 mg/0.5 mL pen injector; Inject 1.5 mg into the skin every 7 days.  Dispense: 4 pen ; Refill: 2    Hydradenitis  -     doxycycline (VIBRA-TABS) 100 MG tablet; Take 1 tablet (100 mg total) by mouth once daily.  Dispense: 90 tablet; Refill: 0        Follow up in about 3 months (around 7/15/2024) for well exam.    Total time spent with patient: 20 mins    Each patient to whom he or she provides medical services by telemedicine is:  (1) informed of the relationship between the physician and patient and the respective role of any other health care provider with respect to management of the  patient; and (2) notified that he or she may decline to receive medical services by telemedicine and may withdraw from such care at any time.

## 2024-05-01 ENCOUNTER — PATIENT MESSAGE (OUTPATIENT)
Dept: FAMILY MEDICINE | Facility: CLINIC | Age: 64
End: 2024-05-01
Payer: COMMERCIAL

## 2024-05-06 RX ORDER — TIRZEPATIDE 5 MG/.5ML
5 INJECTION, SOLUTION SUBCUTANEOUS
Qty: 4 PEN | Refills: 2 | Status: SHIPPED | OUTPATIENT
Start: 2024-05-06

## 2024-05-08 ENCOUNTER — PATIENT MESSAGE (OUTPATIENT)
Dept: FAMILY MEDICINE | Facility: CLINIC | Age: 64
End: 2024-05-08
Payer: COMMERCIAL

## 2024-05-08 DIAGNOSIS — E04.2 MULTINODULAR GOITER: Primary | ICD-10-CM

## 2024-05-08 DIAGNOSIS — R53.82 CHRONIC FATIGUE AND MALAISE: ICD-10-CM

## 2024-05-08 DIAGNOSIS — R53.81 CHRONIC FATIGUE AND MALAISE: ICD-10-CM

## 2024-05-20 ENCOUNTER — PATIENT MESSAGE (OUTPATIENT)
Dept: FAMILY MEDICINE | Facility: CLINIC | Age: 64
End: 2024-05-20
Payer: COMMERCIAL

## 2024-05-20 DIAGNOSIS — M79.10 MUSCLE PAIN: Primary | ICD-10-CM

## 2024-05-23 ENCOUNTER — PATIENT MESSAGE (OUTPATIENT)
Dept: FAMILY MEDICINE | Facility: CLINIC | Age: 64
End: 2024-05-23
Payer: COMMERCIAL

## 2024-05-23 ENCOUNTER — PATIENT MESSAGE (OUTPATIENT)
Dept: SURGERY | Facility: CLINIC | Age: 64
End: 2024-05-23
Payer: COMMERCIAL

## 2024-05-28 ENCOUNTER — PATIENT MESSAGE (OUTPATIENT)
Dept: FAMILY MEDICINE | Facility: CLINIC | Age: 64
End: 2024-05-28
Payer: COMMERCIAL

## 2024-05-28 RX ORDER — CYCLOBENZAPRINE HCL 5 MG
5 TABLET ORAL 3 TIMES DAILY PRN
Qty: 30 TABLET | Refills: 2 | Status: SHIPPED | OUTPATIENT
Start: 2024-05-28

## 2024-05-29 ENCOUNTER — TELEPHONE (OUTPATIENT)
Dept: SURGERY | Facility: CLINIC | Age: 64
End: 2024-05-29
Payer: COMMERCIAL

## 2024-05-29 NOTE — TELEPHONE ENCOUNTER
Called patient to advise Dr. Petersen rec'd her FNA results and asked that I call her to schedule her a follow up appt.  Patient wants her mass removed from thyroid as it is impeding her swallowing pretty bad.  Patient scheduled an appt for 6/6/24 to discuss surgery.

## 2024-06-04 ENCOUNTER — PATIENT MESSAGE (OUTPATIENT)
Dept: FAMILY MEDICINE | Facility: CLINIC | Age: 64
End: 2024-06-04
Payer: MEDICARE

## 2024-06-06 ENCOUNTER — OFFICE VISIT (OUTPATIENT)
Dept: SURGERY | Facility: CLINIC | Age: 64
End: 2024-06-06
Payer: MEDICARE

## 2024-06-06 ENCOUNTER — TELEPHONE (OUTPATIENT)
Dept: SURGERY | Facility: CLINIC | Age: 64
End: 2024-06-06

## 2024-06-06 VITALS
DIASTOLIC BLOOD PRESSURE: 65 MMHG | BODY MASS INDEX: 41.15 KG/M2 | WEIGHT: 247 LBS | TEMPERATURE: 98 F | HEART RATE: 73 BPM | SYSTOLIC BLOOD PRESSURE: 131 MMHG | HEIGHT: 65 IN

## 2024-06-06 DIAGNOSIS — E04.2 MULTINODULAR GOITER: Primary | ICD-10-CM

## 2024-06-06 DIAGNOSIS — E06.9 THYROIDITIS: ICD-10-CM

## 2024-06-06 PROCEDURE — 3078F DIAST BP <80 MM HG: CPT | Mod: CPTII,S$GLB,, | Performed by: SURGERY

## 2024-06-06 PROCEDURE — 3008F BODY MASS INDEX DOCD: CPT | Mod: CPTII,S$GLB,, | Performed by: SURGERY

## 2024-06-06 PROCEDURE — 1159F MED LIST DOCD IN RCRD: CPT | Mod: CPTII,S$GLB,, | Performed by: SURGERY

## 2024-06-06 PROCEDURE — 99999 PR PBB SHADOW E&M-EST. PATIENT-LVL V: CPT | Mod: PBBFAC,,, | Performed by: SURGERY

## 2024-06-06 PROCEDURE — 1160F RVW MEDS BY RX/DR IN RCRD: CPT | Mod: CPTII,S$GLB,, | Performed by: SURGERY

## 2024-06-06 PROCEDURE — 99214 OFFICE O/P EST MOD 30 MIN: CPT | Mod: S$GLB,,, | Performed by: SURGERY

## 2024-06-06 PROCEDURE — 3075F SYST BP GE 130 - 139MM HG: CPT | Mod: CPTII,S$GLB,, | Performed by: SURGERY

## 2024-06-06 RX ORDER — CEFAZOLIN SODIUM 2 G/50ML
2 SOLUTION INTRAVENOUS
OUTPATIENT
Start: 2024-06-06

## 2024-06-06 NOTE — H&P (VIEW-ONLY)
Subjective:       Patient ID: Abril Ontiveros is a 63 y.o. female.    Chief Complaint: Establish Care (Discuss Thyroid surgery)      HPI:  Patient returns the office after FNA thyroid.  FNA has returned [place]estone category 2 on both the right and left thyroid lesion.  She continues to have significant compressive symptoms.  Would like to proceed with total thyroidectomy.    Brief history - 63 year old female referred to the office with large left thyroid nodule. Visible externally. Has left neck tenderness. Nodule nearly 6 cm in the left. She has a 2.2 cm nodule on the right. No history of radiation.      US FINDINGS:  Right thyroid lobe measures 50 x 20 x 20 mm, and left thyroid lobe measures 79 x 34 x 29 mm. Thyroid isthmus thickness is 14 mm.     6 mm hypoechoic nodule lies in superior right thyroid lobe.  8 mm hypoechoic solid nodule in mid thyroid lobe posteriorly contains punctate echogenic foci.  Hypoechoic nodule in right thyroid lobe superiorly and medially measures 7 mm.  Mid right thyroid lobe solid hypoechoic nodule measures 7 mm.  Inferior right thyroid lobe solid hypoechoic nodule measures 10 mm.  Inferior right thyroid lobe hypoechoic solid nodule measures 22 x 16 x 12 mm.     Solid heterogeneous nodule in left thyroid lobe measures 58 x 44 x 38 mm.     Targeted ultrasound in left neck, at site of reported abnormality, shows 3 oh hypoechoic foci measuring less than 10 mm short axis diameter size, likely reflecting lymph nodes. Evaluation of right neck also shows hypoechoic focus measuring 3 mm in short axis diameter are felt to represent a lymph node.     IMPRESSION:     1. Multifocal bilateral thyroid nodules including 58 mm solid nodule in left thyroid lobe. This represents a TI-RADS 5 lesion and, given size, ultrasound-guided FNA is recommended.  2. In the neck bilaterally, oval hypoechoic foci suggesting normal size cervical lymph nodes are present. Clinical correlation is requested to document  stability or resolution. If any progression occurs, further evaluation with CT soft tissue neck with IV contrast is recommended.       Past Medical History:   Diagnosis Date    Allergic rhinitis     Bronchitis 2020    Chronic sinusitis     Constipation     Hidradenitis suppurativa     Hyperlipidemia     Hypertension     Meniere disease     Type 2 diabetes mellitus without complication, without long-term current use of insulin 2020    Type 2 diabetes mellitus without retinopathy 2023    EYE EXAM IN MEDIA     Past Surgical History:   Procedure Laterality Date     SECTION      x 2,  &     COLONOSCOPY N/A 3/29/2021    Procedure: COLONOSCOPY;  Surgeon: Jhoan Mendoza MD;  Location: [facility];  Service: Endoscopy;  Laterality: N/A;    CYST REMOVAL      chest area, left and right underarms    HYSTERECTOMY  1983    OOPHORECTOMY       Review of patient's allergies indicates:   Allergen Reactions    Bactrim [sulfamethoxazole-trimethoprim] Nausea Only     Bad nausea and stomach cramps    Sulfa (sulfonamide antibiotics) Other (See Comments)     Severe cramping     Medication List with Changes/Refills   Current Medications    ALBUTEROL (PROVENTIL/VENTOLIN HFA) 90 MCG/ACTUATION INHALER    INHALE 2 PUFFS BY MOUTH INTO THE LUNGS EVERY 6 HOURS AS NEEDED FOR WHEEZING, RESCUE    AZELASTINE (ASTELIN) 137 MCG (0.1 %) NASAL SPRAY    1 spray (137 mcg total) by Nasal route 2 (two) times daily.    CYCLOBENZAPRINE (FLEXERIL) 5 MG TABLET    Take 1 tablet (5 mg total) by mouth 3 (three) times daily as needed for Muscle spasms.    DOXYCYCLINE (VIBRA-TABS) 100 MG TABLET    Take 1 tablet (100 mg total) by mouth once daily.    DULAGLUTIDE (TRULICITY) 1.5 MG/0.5 ML PEN INJECTOR    Inject 1.5 mg into the skin every 7 days.    FLUTICASONE PROPIONATE (FLONASE) 50 MCG/ACTUATION NASAL SPRAY    SHAKE LIQUID AND USE 1 SPRAY IN EACH NOSTRIL TWICE DAILY    FUROSEMIDE (LASIX) 20 MG TABLET    TAKE 1  TABLET(20 MG) BY MOUTH EVERY DAY    HYDROCHLOROTHIAZIDE (HYDRODIURIL) 25 MG TABLET    Take 1 tablet (25 mg total) by mouth once daily.    HYDROCORTISONE 2.5 % CREAM    Apply topically 2 (two) times daily.    LINACLOTIDE (LINZESS) 290 MCG CAP CAPSULE    Take 1 capsule (290 mcg total) by mouth once daily.    MUPIROCIN (BACTROBAN) 2 % OINTMENT    Apply topically 3 (three) times daily. To nares    MV-MN/FOLIC ACID/VIT [id number] (ALIVE ONCE DAILY WOMEN 50 PLUS ORAL)    Take 1 tablet by mouth once daily.    PHENTERMINE (ADIPEX-P) 37.5 MG TABLET    Take 1 tablet (37.5 mg total) by mouth once daily.    TIRZEPATIDE (MOUNJARO) 5 MG/0.5 ML PNIJ    Inject 5 mg into the skin every 7 days.     Family History   Problem Relation Name Age of Onset    Diabetes Mother      Cancer Mother          unknown  type. Hysterectomy.    Cancer Father          throat cancer. Alcohol abuse, tobacco use.    Alcohol abuse Father      Hyperthyroidism Sister      COPD Brother      Thyroid disease Maternal Aunt       Social History     Socioeconomic History    Marital status:     Number of children: 2   Occupational History     Employer: [employer]   Tobacco Use    Smoking status: Never    Smokeless tobacco: Never   Substance and Sexual Activity    Alcohol use: No    Drug use: No     Social Determinants of Health     Financial Resource Strain: Medium Risk (1/12/2024)    Overall Financial Resource Strain (CARDIA)     Difficulty of Paying Living Expenses: Somewhat hard   Food Insecurity: No Food Insecurity (1/12/2024)    Hunger Vital Sign     Worried About Running Out of Food in the Last Year: Never true     Ran Out of Food in the Last Year: Never true   Transportation Needs: No Transportation Needs (1/12/2024)    PRAPARE - Transportation     Lack of Transportation (Medical): No     Lack of Transportation (Non-Medical): No   Physical Activity: Insufficiently Active (1/12/2024)    Exercise Vital Sign     Days of Exercise per Week: 3  days     Minutes of Exercise per Session: 30 min   Stress: Patient Declined (1/12/2024)    [employer] [place] [employer] - Occupational Stress Questionnaire     Feeling of Stress : Patient declined   Housing Stability: Low Risk  (1/12/2024)    Housing Stability Vital Sign     Unable to Pay for Housing in the Last Year: No     Number of Places Lived in the Last Year: 2     Unstable Housing in the Last Year: No         Review of Systems   Constitutional:  Negative for appetite change, chills, fever and unexpected weight change.   HENT:  Positive for sore throat. Negative for hearing loss, rhinorrhea and voice change.    Eyes:  Negative for photophobia and visual disturbance.   Respiratory:  Negative for cough, choking and shortness of breath.    Cardiovascular:  Negative for chest pain, palpitations and leg swelling.   Gastrointestinal:  Negative for abdominal pain, blood in stool, constipation, diarrhea, nausea and vomiting.   Endocrine: Negative for cold intolerance, heat intolerance, polydipsia and polyuria.   Musculoskeletal:  Negative for arthralgias, back pain, joint swelling and neck stiffness.   Skin:  Negative for color change, pallor and rash.   Neurological:  Negative for dizziness, seizures, syncope and headaches.   Hematological:  Negative for adenopathy. Does not bruise/bleed easily.   Psychiatric/Behavioral:  Negative for agitation, behavioral problems and confusion.      Objective:      Physical Exam  Constitutional:       General: She is awake. She is not in acute distress.     Appearance: She is not toxic-appearing.   HENT:      Head: Normocephalic and atraumatic.   Neck:      Thyroid: Thyroid mass, thyromegaly and thyroid tenderness present.     Pulmonary:      Effort: Pulmonary effort is normal. No tachypnea, bradypnea, accessory muscle usage or respiratory distress.   Abdominal:      General: There is no distension.      Palpations: Abdomen is soft.      Tenderness: There is no  abdominal tenderness.   Lymphadenopathy:      Cervical: No cervical adenopathy.      Right cervical: No superficial or deep cervical adenopathy.     Left cervical: No superficial or deep cervical adenopathy.   Neurological:      Mental Status: She is alert and oriented to person, place, and time.   Psychiatric:         Behavior: Behavior is cooperative.       Assessment/Plan:   Multinodular goiter  -     Vital signs; Standing  -     Insert peripheral IV; Standing  -     Diet NPO; Standing  -     Place sequential compression device; Standing  -     Pulse Oximetry Q4H; Standing  -     Case Request Operating Room: THYROIDECTOMY  -     Full code; Standing  -     Place in Outpatient; Standing  -     Comprehensive metabolic panel; Future; Expected date: 06/06/2024  -     CBC auto differential; Future; Expected date: 06/06/2024  -     EKG 12-lead; Future  -     X-Ray Chest PA And Lateral; Future; Expected date: 06/06/2024    Thyroiditis  -     Vital signs; Standing  -     Insert peripheral IV; Standing  -     Diet NPO; Standing  -     Place sequential compression device; Standing  -     Pulse Oximetry Q4H; Standing  -     Case Request Operating Room: THYROIDECTOMY  -     Full code; Standing  -     Place in Outpatient; Standing  -     Comprehensive metabolic panel; Future; Expected date: 06/06/2024  -     CBC auto differential; Future; Expected date: 06/06/2024  -     EKG 12-lead; Future  -     X-Ray Chest PA And Lateral; Future; Expected date: 06/06/2024    Other orders  -     IP VTE LOW RISK PATIENT; Standing  -     cefazolin (ANCEF) 2 gram in dextrose 5% 50 mL IVPB (premix)      Will plan on total thyroidectomy June 12th.  Risks and benefits of the procedure discussed with the patient.  Will also have nerve monitoring intraoperatively.      I discussed the proposed procedures with the patient including risks, benefits, indications, alternatives and special concerns.  The patient appears to understand and agrees to go  ahead with surgery.  I have made no promises, warranties or verbal agreements beyond what was discussed above.    No follow-ups on file.

## 2024-06-06 NOTE — TELEPHONE ENCOUNTER
Patient advised to not take her Monjouro injection the morning of her surgery.  Her last dose was taken on 6/5/24 and her next dose is due 6/12/24.  Patient advised she can resume her Monjouro after her surgery as normal.

## 2024-06-06 NOTE — PROGRESS NOTES
Subjective:       Patient ID: Abril Ontiveros is a 63 y.o. female.    Chief Complaint: Establish Care (Discuss Thyroid surgery)      HPI:  Patient returns the office after FNA thyroid.  FNA has returned [place] category 2 on both the right and left thyroid lesion.  She continues to have significant compressive symptoms.  Would like to proceed with total thyroidectomy.    Brief history - 63 year old female referred to the office with large left thyroid nodule. Visible externally. Has left neck tenderness. Nodule nearly 6 cm in the left. She has a 2.2 cm nodule on the right. No history of radiation.      US FINDINGS:  Right thyroid lobe measures 50 x 20 x 20 mm, and left thyroid lobe measures 79 x 34 x 29 mm. Thyroid isthmus thickness is 14 mm.     6 mm hypoechoic nodule lies in superior right thyroid lobe.  8 mm hypoechoic solid nodule in mid thyroid lobe posteriorly contains punctate echogenic foci.  Hypoechoic nodule in right thyroid lobe superiorly and medially measures 7 mm.  Mid right thyroid lobe solid hypoechoic nodule measures 7 mm.  Inferior right thyroid lobe solid hypoechoic nodule measures 10 mm.  Inferior right thyroid lobe hypoechoic solid nodule measures 22 x 16 x 12 mm.     Solid heterogeneous nodule in left thyroid lobe measures 58 x 44 x 38 mm.     Targeted ultrasound in left neck, at site of reported abnormality, shows 3 oh hypoechoic foci measuring less than 10 mm short axis diameter size, likely reflecting lymph nodes. Evaluation of right neck also shows hypoechoic focus measuring 3 mm in short axis diameter are felt to represent a lymph node.     IMPRESSION:     1. Multifocal bilateral thyroid nodules including 58 mm solid nodule in left thyroid lobe. This represents a TI-RADS 5 lesion and, given size, ultrasound-guided FNA is recommended.  2. In the neck bilaterally, oval hypoechoic foci suggesting normal size cervical lymph nodes are present. Clinical correlation is requested to document  stability or resolution. If any progression occurs, further evaluation with CT soft tissue neck with IV contrast is recommended.       Past Medical History:   Diagnosis Date    Allergic rhinitis     Bronchitis 2020    Chronic sinusitis     Constipation     Hidradenitis suppurativa     Hyperlipidemia     Hypertension     Meniere disease     Type 2 diabetes mellitus without complication, without long-term current use of insulin 2020    Type 2 diabetes mellitus without retinopathy 2023    EYE EXAM IN MEDIA     Past Surgical History:   Procedure Laterality Date     SECTION      x 2,  &     COLONOSCOPY N/A 3/29/2021    Procedure: COLONOSCOPY;  Surgeon: Jhoan Mendoza MD;  Location: [facility];  Service: Endoscopy;  Laterality: N/A;    CYST REMOVAL      chest area, left and right underarms    HYSTERECTOMY  1983    OOPHORECTOMY       Review of patient's allergies indicates:   Allergen Reactions    Bactrim [sulfamethoxazole-trimethoprim] Nausea Only     Bad nausea and stomach cramps    Sulfa (sulfonamide antibiotics) Other (See Comments)     Severe cramping     Medication List with Changes/Refills   Current Medications    ALBUTEROL (PROVENTIL/VENTOLIN HFA) 90 MCG/ACTUATION INHALER    INHALE 2 PUFFS BY MOUTH INTO THE LUNGS EVERY 6 HOURS AS NEEDED FOR WHEEZING, RESCUE    AZELASTINE (ASTELIN) 137 MCG (0.1 %) NASAL SPRAY    1 spray (137 mcg total) by Nasal route 2 (two) times daily.    CYCLOBENZAPRINE (FLEXERIL) 5 MG TABLET    Take 1 tablet (5 mg total) by mouth 3 (three) times daily as needed for Muscle spasms.    DOXYCYCLINE (VIBRA-TABS) 100 MG TABLET    Take 1 tablet (100 mg total) by mouth once daily.    DULAGLUTIDE (TRULICITY) 1.5 MG/0.5 ML PEN INJECTOR    Inject 1.5 mg into the skin every 7 days.    FLUTICASONE PROPIONATE (FLONASE) 50 MCG/ACTUATION NASAL SPRAY    SHAKE LIQUID AND USE 1 SPRAY IN EACH NOSTRIL TWICE DAILY    FUROSEMIDE (LASIX) 20 MG TABLET    TAKE 1  TABLET(20 MG) BY MOUTH EVERY DAY    HYDROCHLOROTHIAZIDE (HYDRODIURIL) 25 MG TABLET    Take 1 tablet (25 mg total) by mouth once daily.    HYDROCORTISONE 2.5 % CREAM    Apply topically 2 (two) times daily.    LINACLOTIDE (LINZESS) 290 MCG CAP CAPSULE    Take 1 capsule (290 mcg total) by mouth once daily.    MUPIROCIN (BACTROBAN) 2 % OINTMENT    Apply topically 3 (three) times daily. To nares    MV-MN/FOLIC ACID/VIT [id number] (ALIVE ONCE DAILY WOMEN 50 PLUS ORAL)    Take 1 tablet by mouth once daily.    PHENTERMINE (ADIPEX-P) 37.5 MG TABLET    Take 1 tablet (37.5 mg total) by mouth once daily.    TIRZEPATIDE (MOUNJARO) 5 MG/0.5 ML PNIJ    Inject 5 mg into the skin every 7 days.     Family History   Problem Relation Name Age of Onset    Diabetes Mother      Cancer Mother          unknown  type. Hysterectomy.    Cancer Father          throat cancer. Alcohol abuse, tobacco use.    Alcohol abuse Father      Hyperthyroidism Sister      COPD Brother      Thyroid disease Maternal Aunt       Social History     Socioeconomic History    Marital status:     Number of children: 2   Occupational History     Employer: [employer]   Tobacco Use    Smoking status: Never    Smokeless tobacco: Never   Substance and Sexual Activity    Alcohol use: No    Drug use: No     Social Determinants of Health     Financial Resource Strain: Medium Risk (1/12/2024)    Overall Financial Resource Strain (CARDIA)     Difficulty of Paying Living Expenses: Somewhat hard   Food Insecurity: No Food Insecurity (1/12/2024)    Hunger Vital Sign     Worried About Running Out of Food in the Last Year: Never true     Ran Out of Food in the Last Year: Never true   Transportation Needs: No Transportation Needs (1/12/2024)    PRAPARE - Transportation     Lack of Transportation (Medical): No     Lack of Transportation (Non-Medical): No   Physical Activity: Insufficiently Active (1/12/2024)    Exercise Vital Sign     Days of Exercise per Week: 3  days     Minutes of Exercise per Session: 30 min   Stress: Patient Declined (1/12/2024)    [employer] - Occupational Stress Questionnaire     Feeling of Stress : Patient declined   Housing Stability: Low Risk  (1/12/2024)    Housing Stability Vital Sign     Unable to Pay for Housing in the Last Year: No     Number of Places Lived in the Last Year: 2     Unstable Housing in the Last Year: No         Review of Systems   Constitutional:  Negative for appetite change, chills, fever and unexpected weight change.   HENT:  Positive for sore throat. Negative for hearing loss, rhinorrhea and voice change.    Eyes:  Negative for photophobia and visual disturbance.   Respiratory:  Negative for cough, choking and shortness of breath.    Cardiovascular:  Negative for chest pain, palpitations and leg swelling.   Gastrointestinal:  Negative for abdominal pain, blood in stool, constipation, diarrhea, nausea and vomiting.   Endocrine: Negative for cold intolerance, heat intolerance, polydipsia and polyuria.   Musculoskeletal:  Negative for arthralgias, back pain, joint swelling and neck stiffness.   Skin:  Negative for color change, pallor and rash.   Neurological:  Negative for dizziness, seizures, syncope and headaches.   Hematological:  Negative for adenopathy. Does not bruise/bleed easily.   Psychiatric/Behavioral:  Negative for agitation, behavioral problems and confusion.      Objective:      Physical Exam  Constitutional:       General: She is awake. She is not in acute distress.     Appearance: She is not toxic-appearing.   HENT:      Head: Normocephalic and atraumatic.   Neck:      Thyroid: Thyroid mass, thyromegaly and thyroid tenderness present.     Pulmonary:      Effort: Pulmonary effort is normal. No tachypnea, bradypnea, accessory muscle usage or respiratory distress.   Abdominal:      General: There is no distension.      Palpations: Abdomen is soft.      Tenderness: There is no  abdominal tenderness.   Lymphadenopathy:      Cervical: No cervical adenopathy.      Right cervical: No superficial or deep cervical adenopathy.     Left cervical: No superficial or deep cervical adenopathy.   Neurological:      Mental Status: She is alert and oriented to person, place, and time.   Psychiatric:         Behavior: Behavior is cooperative.       Assessment/Plan:   Multinodular goiter  -     Vital signs; Standing  -     Insert peripheral IV; Standing  -     Diet NPO; Standing  -     Place sequential compression device; Standing  -     Pulse Oximetry Q4H; Standing  -     Case Request Operating Room: THYROIDECTOMY  -     Full code; Standing  -     Place in Outpatient; Standing  -     Comprehensive metabolic panel; Future; Expected date: 06/06/2024  -     CBC auto differential; Future; Expected date: 06/06/2024  -     EKG 12-lead; Future  -     X-Ray Chest PA And Lateral; Future; Expected date: 06/06/2024    Thyroiditis  -     Vital signs; Standing  -     Insert peripheral IV; Standing  -     Diet NPO; Standing  -     Place sequential compression device; Standing  -     Pulse Oximetry Q4H; Standing  -     Case Request Operating Room: THYROIDECTOMY  -     Full code; Standing  -     Place in Outpatient; Standing  -     Comprehensive metabolic panel; Future; Expected date: 06/06/2024  -     CBC auto differential; Future; Expected date: 06/06/2024  -     EKG 12-lead; Future  -     X-Ray Chest PA And Lateral; Future; Expected date: 06/06/2024    Other orders  -     IP VTE LOW RISK PATIENT; Standing  -     cefazolin (ANCEF) 2 gram in dextrose 5% 50 mL IVPB (premix)      Will plan on total thyroidectomy June 12th.  Risks and benefits of the procedure discussed with the patient.  Will also have nerve monitoring intraoperatively.      I discussed the proposed procedures with the patient including risks, benefits, indications, alternatives and special concerns.  The patient appears to understand and agrees to go  ahead with surgery.  I have made no promises, warranties or verbal agreements beyond what was discussed above.    No follow-ups on file.

## 2024-06-10 ENCOUNTER — PATIENT MESSAGE (OUTPATIENT)
Dept: SURGERY | Facility: CLINIC | Age: 64
End: 2024-06-10
Payer: MEDICARE

## 2024-06-11 ENCOUNTER — HOSPITAL ENCOUNTER (OUTPATIENT)
Dept: PREADMISSION TESTING | Facility: HOSPITAL | Age: 64
Discharge: HOME | End: 2024-06-11
Attending: SURGERY
Payer: MEDICARE

## 2024-06-11 ENCOUNTER — HOSPITAL ENCOUNTER (OUTPATIENT)
Dept: RADIOLOGY | Facility: HOSPITAL | Age: 64
Discharge: HOME | End: 2024-06-11
Attending: SURGERY
Payer: MEDICARE

## 2024-06-11 ENCOUNTER — PATIENT MESSAGE (OUTPATIENT)
Dept: FAMILY MEDICINE | Facility: CLINIC | Age: 64
End: 2024-06-11
Payer: MEDICARE

## 2024-06-11 VITALS
RESPIRATION RATE: 16 BRPM | DIASTOLIC BLOOD PRESSURE: 85 MMHG | WEIGHT: 251.75 LBS | BODY MASS INDEX: 41.94 KG/M2 | SYSTOLIC BLOOD PRESSURE: 155 MMHG | TEMPERATURE: 98 F | HEART RATE: 68 BPM | HEIGHT: 65 IN | OXYGEN SATURATION: 95 %

## 2024-06-11 DIAGNOSIS — E06.9 THYROIDITIS: ICD-10-CM

## 2024-06-11 DIAGNOSIS — E04.2 MULTINODULAR GOITER: ICD-10-CM

## 2024-06-11 LAB
OHS QRS DURATION: 78 MS
OHS QTC CALCULATION: 429 MS

## 2024-06-11 PROCEDURE — 71046 X-RAY EXAM CHEST 2 VIEWS: CPT | Mod: 26,,, | Performed by: RADIOLOGY

## 2024-06-11 PROCEDURE — 93010 ELECTROCARDIOGRAM REPORT: CPT | Mod: ,,, | Performed by: GENERAL PRACTICE

## 2024-06-11 PROCEDURE — 93005 ELECTROCARDIOGRAM TRACING: CPT | Performed by: GENERAL PRACTICE

## 2024-06-11 PROCEDURE — 71046 X-RAY EXAM CHEST 2 VIEWS: CPT | Mod: TC

## 2024-06-11 NOTE — DISCHARGE INSTRUCTIONS
You will be called the afternoon prior to surgery between 4:00 - 6:00 PM with a final arrival time.    Please report to [facility] the morning of surgery (parking garage entrance, [address]     MEDICATIONS:  TAKE ONLY THESE MEDICATIONS WITH A SMALL SIP OF WATER THE MORNING OF YOUR PROCEDURE:  See attached      DO NOT TAKE THESE MEDICATIONS 5-7 DAYS PRIOR to procedure or per your surgeon's request: ASPIRIN, ALEVE, ADVIL, IBUPROFEN, FISH OIL, VITAMIN E, HERBALS  (May take Tylenol and/or prescription pain medicine)    DO NOT TAKE BLOOD THINNERS for 7 days prior to procedure, or as instructed by surgeon.  This includes: Aspirin, Coumadin, Plavix, Pradaxa, Xarelto, Aggrenox, Effient, Eliquis, Savasya, Brilinta, etc.      INSTRUCTIONS, IMPORTANT!  Do not eat or drink anything between midnight and the time of your procedure- this includes gum, mints, and candy.  Shower the night before AND the morning of your procedure with Hibiclens wash or Dial antibacterial soap, from the neck down.  Do not get it on your face or in your eyes.  You may use your own shampoo and face wash.   Do not smoke, vape, or drink alcoholic beverages 24 hours before your procedure.  Wear loose, comfortable clothing.  Bring a case for removable items - contact lenses, dentures, hearing aids, glasses.  Leave all jewelry, piercings, and valuables at home.  Diabetic patients - please check your sugar in the morning before your procedure and do not take any diabetic medicine or insulin.   DO NOT shave the incision site unless you are given specific instructions to do so.    If you have sleep apnea please bring your C-PAP machine.  If your doctor has scheduled you for an overnight stay bring a small overnight bag with any personal items you may need.   Make arrangements in advance for transportation home by a responsible adult.  TAXIS, UBERS OR LYFTS ARE NOT ALLOWED UPON DISCHARGE.  Please remain with a responsible adult for 24 hours after  anesthesia.       PLEASE NOTE:  The surgery schedule has many variables which may affect the time of your surgery case.  Family members should be available if your surgery time changes.  Plan to be here the day of your procedure between 4-6 hours.    If you have any questions about these instructions, call Pre-Op Admit  Nursing at [phone] or the Pre-Op Day Surgery Unit at [phone].      OTHER:  _______________________________________________________________________________________

## 2024-06-12 ENCOUNTER — ANESTHESIA (OUTPATIENT)
Dept: SURGERY | Facility: HOSPITAL | Age: 64
End: 2024-06-12
Payer: MEDICARE

## 2024-06-12 ENCOUNTER — ANESTHESIA EVENT (OUTPATIENT)
Dept: SURGERY | Facility: HOSPITAL | Age: 64
End: 2024-06-12
Payer: MEDICARE

## 2024-06-12 ENCOUNTER — HOSPITAL ENCOUNTER (OUTPATIENT)
Facility: HOSPITAL | Age: 64
Discharge: HOME | End: 2024-06-13
Attending: SURGERY | Admitting: SURGERY
Payer: MEDICARE

## 2024-06-12 DIAGNOSIS — E04.2 MULTINODULAR GOITER: Primary | ICD-10-CM

## 2024-06-12 DIAGNOSIS — R07.89 CHEST PAIN RADIATING TO ARM: ICD-10-CM

## 2024-06-12 LAB
CA-I BLDV-SCNC: 1.18 MMOL/L (ref 1.06–1.42)
GLUCOSE SERPL-MCNC: 132 MG/DL (ref 70–110)
GLUCOSE SERPL-MCNC: 93 MG/DL (ref 70–110)

## 2024-06-12 PROCEDURE — 94799 UNLISTED PULMONARY SVC/PX: CPT

## 2024-06-12 PROCEDURE — 88305 TISSUE EXAM BY PATHOLOGIST: CPT | Mod: TC,59 | Performed by: PATHOLOGY

## 2024-06-12 PROCEDURE — 25000003 PHARM REV CODE 250: Performed by: SURGERY

## 2024-06-12 PROCEDURE — V2790 AMNIOTIC MEMBRANE: HCPCS | Performed by: SURGERY

## 2024-06-12 PROCEDURE — D9220A PRA ANESTHESIA: Mod: CRNA,,, | Performed by: STUDENT IN AN ORGANIZED HEALTH CARE EDUCATION/TRAINING PROGRAM

## 2024-06-12 PROCEDURE — 63600175 PHARM REV CODE 636 W HCPCS: Performed by: STUDENT IN AN ORGANIZED HEALTH CARE EDUCATION/TRAINING PROGRAM

## 2024-06-12 PROCEDURE — 94761 N-INVAS EAR/PLS OXIMETRY MLT: CPT

## 2024-06-12 PROCEDURE — 36000707: Performed by: SURGERY

## 2024-06-12 PROCEDURE — 27201423 OPTIME MED/SURG SUP & DEVICES STERILE SUPPLY: Performed by: SURGERY

## 2024-06-12 PROCEDURE — 36000706: Performed by: SURGERY

## 2024-06-12 PROCEDURE — 71000033 HC RECOVERY, INTIAL HOUR: Performed by: SURGERY

## 2024-06-12 PROCEDURE — 25000003 PHARM REV CODE 250: Performed by: STUDENT IN AN ORGANIZED HEALTH CARE EDUCATION/TRAINING PROGRAM

## 2024-06-12 PROCEDURE — 63600175 PHARM REV CODE 636 W HCPCS: Performed by: SURGERY

## 2024-06-12 PROCEDURE — 99406 BEHAV CHNG SMOKING 3-10 MIN: CPT

## 2024-06-12 PROCEDURE — 37000008 HC ANESTHESIA 1ST 15 MINUTES: Performed by: SURGERY

## 2024-06-12 PROCEDURE — D9220A PRA ANESTHESIA: Mod: ANES,,, | Performed by: STUDENT IN AN ORGANIZED HEALTH CARE EDUCATION/TRAINING PROGRAM

## 2024-06-12 PROCEDURE — 99900035 HC TECH TIME PER 15 MIN (STAT)

## 2024-06-12 PROCEDURE — 37000009 HC ANESTHESIA EA ADD 15 MINS: Performed by: SURGERY

## 2024-06-12 PROCEDURE — 82330 ASSAY OF CALCIUM: CPT | Performed by: SURGERY

## 2024-06-12 PROCEDURE — 71000039 HC RECOVERY, EACH ADD'L HOUR: Performed by: SURGERY

## 2024-06-12 PROCEDURE — 60240 REMOVAL OF THYROID: CPT | Mod: ,,, | Performed by: SURGERY

## 2024-06-12 DEVICE — MEMBRANE AMNIOFIX 4X6CM: Type: IMPLANTABLE DEVICE | Site: THYROID | Status: FUNCTIONAL

## 2024-06-12 RX ORDER — FLUTICASONE PROPIONATE 50 MCG
1 SPRAY, SUSPENSION (ML) NASAL DAILY
Status: DISCONTINUED | OUTPATIENT
Start: 2024-06-13 | End: 2024-06-13 | Stop reason: HOSPADM

## 2024-06-12 RX ORDER — LIDOCAINE HYDROCHLORIDE 20 MG/ML
JELLY TOPICAL
Status: DISCONTINUED | OUTPATIENT
Start: 2024-06-12 | End: 2024-06-12

## 2024-06-12 RX ORDER — HYDROCHLOROTHIAZIDE 12.5 MG/1
25 TABLET ORAL DAILY
Status: DISCONTINUED | OUTPATIENT
Start: 2024-06-13 | End: 2024-06-13 | Stop reason: HOSPADM

## 2024-06-12 RX ORDER — MEPERIDINE HYDROCHLORIDE 50 MG/ML
12.5 INJECTION INTRAMUSCULAR; INTRAVENOUS; SUBCUTANEOUS EVERY 10 MIN PRN
Status: DISCONTINUED | OUTPATIENT
Start: 2024-06-12 | End: 2024-06-12 | Stop reason: HOSPADM

## 2024-06-12 RX ORDER — KETAMINE HCL IN 0.9 % NACL 50 MG/5 ML
SYRINGE (ML) INTRAVENOUS
Status: DISCONTINUED | OUTPATIENT
Start: 2024-06-12 | End: 2024-06-12

## 2024-06-12 RX ORDER — HYDROCODONE BITARTRATE AND ACETAMINOPHEN 5; 325 MG/1; MG/1
1 TABLET ORAL EVERY 4 HOURS PRN
Status: DISCONTINUED | OUTPATIENT
Start: 2024-06-12 | End: 2024-06-13 | Stop reason: HOSPADM

## 2024-06-12 RX ORDER — DEXMEDETOMIDINE HYDROCHLORIDE 100 UG/ML
INJECTION, SOLUTION INTRAVENOUS
Status: COMPLETED
Start: 2024-06-12 | End: 2024-06-12

## 2024-06-12 RX ORDER — MIDAZOLAM HYDROCHLORIDE 1 MG/ML
INJECTION INTRAMUSCULAR; INTRAVENOUS
Status: DISCONTINUED | OUTPATIENT
Start: 2024-06-12 | End: 2024-06-12

## 2024-06-12 RX ORDER — HYDROMORPHONE HYDROCHLORIDE 1 MG/ML
0.2 INJECTION, SOLUTION INTRAMUSCULAR; INTRAVENOUS; SUBCUTANEOUS EVERY 5 MIN PRN
Status: DISCONTINUED | OUTPATIENT
Start: 2024-06-12 | End: 2024-06-12 | Stop reason: HOSPADM

## 2024-06-12 RX ORDER — HYDROMORPHONE HYDROCHLORIDE 1 MG/ML
1 INJECTION, SOLUTION INTRAMUSCULAR; INTRAVENOUS; SUBCUTANEOUS EVERY 4 HOURS PRN
Status: DISCONTINUED | OUTPATIENT
Start: 2024-06-12 | End: 2024-06-13 | Stop reason: HOSPADM

## 2024-06-12 RX ORDER — FAMOTIDINE 10 MG/ML
INJECTION INTRAVENOUS
Status: DISCONTINUED | OUTPATIENT
Start: 2024-06-12 | End: 2024-06-12

## 2024-06-12 RX ORDER — FENTANYL CITRATE 50 UG/ML
INJECTION, SOLUTION INTRAMUSCULAR; INTRAVENOUS
Status: DISCONTINUED | OUTPATIENT
Start: 2024-06-12 | End: 2024-06-12

## 2024-06-12 RX ORDER — ONDANSETRON HYDROCHLORIDE 2 MG/ML
INJECTION, SOLUTION INTRAVENOUS
Status: DISCONTINUED | OUTPATIENT
Start: 2024-06-12 | End: 2024-06-12

## 2024-06-12 RX ORDER — LIDOCAINE HYDROCHLORIDE 20 MG/ML
INJECTION, SOLUTION EPIDURAL; INFILTRATION; INTRACAUDAL; PERINEURAL
Status: DISCONTINUED | OUTPATIENT
Start: 2024-06-12 | End: 2024-06-12

## 2024-06-12 RX ORDER — SUCCINYLCHOLINE CHLORIDE 20 MG/ML
INJECTION INTRAMUSCULAR; INTRAVENOUS
Status: DISCONTINUED | OUTPATIENT
Start: 2024-06-12 | End: 2024-06-12

## 2024-06-12 RX ORDER — DEXAMETHASONE SODIUM PHOSPHATE 4 MG/ML
INJECTION, SOLUTION INTRA-ARTICULAR; INTRALESIONAL; INTRAMUSCULAR; INTRAVENOUS; SOFT TISSUE
Status: DISCONTINUED | OUTPATIENT
Start: 2024-06-12 | End: 2024-06-12

## 2024-06-12 RX ORDER — CYCLOBENZAPRINE HCL 5 MG
5 TABLET ORAL 3 TIMES DAILY PRN
Status: DISCONTINUED | OUTPATIENT
Start: 2024-06-12 | End: 2024-06-13 | Stop reason: HOSPADM

## 2024-06-12 RX ORDER — OXYCODONE HYDROCHLORIDE 5 MG/1
5 TABLET ORAL
Status: DISCONTINUED | OUTPATIENT
Start: 2024-06-12 | End: 2024-06-12 | Stop reason: HOSPADM

## 2024-06-12 RX ORDER — ONDANSETRON HYDROCHLORIDE 2 MG/ML
4 INJECTION, SOLUTION INTRAVENOUS DAILY PRN
Status: DISCONTINUED | OUTPATIENT
Start: 2024-06-12 | End: 2024-06-12 | Stop reason: HOSPADM

## 2024-06-12 RX ORDER — DIPHENHYDRAMINE HYDROCHLORIDE 50 MG/ML
12.5 INJECTION INTRAMUSCULAR; INTRAVENOUS
Status: DISCONTINUED | OUTPATIENT
Start: 2024-06-12 | End: 2024-06-12 | Stop reason: HOSPADM

## 2024-06-12 RX ORDER — MUPIROCIN 20 MG/G
1 OINTMENT TOPICAL 2 TIMES DAILY
Status: DISCONTINUED | OUTPATIENT
Start: 2024-06-12 | End: 2024-06-13 | Stop reason: HOSPADM

## 2024-06-12 RX ORDER — PROPOFOL 10 MG/ML
VIAL (ML) INTRAVENOUS
Status: DISCONTINUED | OUTPATIENT
Start: 2024-06-12 | End: 2024-06-12

## 2024-06-12 RX ORDER — DEXMEDETOMIDINE HYDROCHLORIDE 100 UG/ML
INJECTION, SOLUTION INTRAVENOUS
Status: DISCONTINUED | OUTPATIENT
Start: 2024-06-12 | End: 2024-06-12

## 2024-06-12 RX ORDER — ACETAMINOPHEN 10 MG/ML
INJECTION, SOLUTION INTRAVENOUS
Status: DISCONTINUED | OUTPATIENT
Start: 2024-06-12 | End: 2024-06-12

## 2024-06-12 RX ORDER — CEFAZOLIN SODIUM 1 G/3ML
INJECTION, POWDER, FOR SOLUTION INTRAMUSCULAR; INTRAVENOUS
Status: DISCONTINUED | OUTPATIENT
Start: 2024-06-12 | End: 2024-06-12

## 2024-06-12 RX ORDER — SODIUM CHLORIDE, SODIUM LACTATE, POTASSIUM CHLORIDE, CALCIUM CHLORIDE 600; 310; 30; 20 MG/100ML; MG/100ML; MG/100ML; MG/100ML
INJECTION, SOLUTION INTRAVENOUS CONTINUOUS PRN
Status: DISCONTINUED | OUTPATIENT
Start: 2024-06-12 | End: 2024-06-12

## 2024-06-12 RX ADMIN — MUPIROCIN 1 G: 20 OINTMENT TOPICAL at 10:06

## 2024-06-12 RX ADMIN — FAMOTIDINE 20 MG: 10 INJECTION, SOLUTION INTRAVENOUS at 03:06

## 2024-06-12 RX ADMIN — Medication 15 MG: at 04:06

## 2024-06-12 RX ADMIN — LIDOCAINE HYDROCHLORIDE 100 MG: 20 INJECTION, SOLUTION INTRAVENOUS at 03:06

## 2024-06-12 RX ADMIN — DEXAMETHASONE SODIUM PHOSPHATE 8 MG: 4 INJECTION, SOLUTION INTRAMUSCULAR; INTRAVENOUS at 03:06

## 2024-06-12 RX ADMIN — FENTANYL CITRATE 100 MCG: 50 INJECTION, SOLUTION INTRAMUSCULAR; INTRAVENOUS at 03:06

## 2024-06-12 RX ADMIN — ONDANSETRON 4 MG: 2 INJECTION INTRAMUSCULAR; INTRAVENOUS at 03:06

## 2024-06-12 RX ADMIN — ACETAMINOPHEN 1000 MG: 10 INJECTION, SOLUTION INTRAVENOUS at 03:06

## 2024-06-12 RX ADMIN — HYDROMORPHONE HYDROCHLORIDE 0.2 MG: 1 INJECTION, SOLUTION INTRAMUSCULAR; INTRAVENOUS; SUBCUTANEOUS at 06:06

## 2024-06-12 RX ADMIN — PROPOFOL 150 MG: 10 INJECTION, EMULSION INTRAVENOUS at 03:06

## 2024-06-12 RX ADMIN — HYDROMORPHONE HYDROCHLORIDE 1 MG: 1 INJECTION, SOLUTION INTRAMUSCULAR; INTRAVENOUS; SUBCUTANEOUS at 10:06

## 2024-06-12 RX ADMIN — HYDROMORPHONE HYDROCHLORIDE 0.2 MG: 1 INJECTION, SOLUTION INTRAMUSCULAR; INTRAVENOUS; SUBCUTANEOUS at 07:06

## 2024-06-12 RX ADMIN — DEXMEDETOMIDINE HYDROCHLORIDE 16 MCG: 100 INJECTION, SOLUTION INTRAVENOUS at 04:06

## 2024-06-12 RX ADMIN — DEXMEDETOMIDINE HYDROCHLORIDE 8 MCG: 100 INJECTION, SOLUTION INTRAVENOUS at 04:06

## 2024-06-12 RX ADMIN — Medication 200 MG: at 03:06

## 2024-06-12 RX ADMIN — DEXMEDETOMIDINE HYDROCHLORIDE 12 MCG: 100 INJECTION, SOLUTION INTRAVENOUS at 05:06

## 2024-06-12 RX ADMIN — LIDOCAINE HYDROCHLORIDE 4 ML: 20 JELLY TOPICAL at 03:06

## 2024-06-12 RX ADMIN — SODIUM CHLORIDE, SODIUM LACTATE, POTASSIUM CHLORIDE, AND CALCIUM CHLORIDE: .6; .31; .03; .02 INJECTION, SOLUTION INTRAVENOUS at 04:06

## 2024-06-12 RX ADMIN — OXYCODONE HYDROCHLORIDE 5 MG: 5 TABLET ORAL at 07:06

## 2024-06-12 RX ADMIN — MIDAZOLAM HYDROCHLORIDE 2 MG: 1 INJECTION, SOLUTION INTRAMUSCULAR; INTRAVENOUS at 03:06

## 2024-06-12 RX ADMIN — DEXMEDETOMIDINE HYDROCHLORIDE 16 MCG: 100 INJECTION, SOLUTION INTRAVENOUS at 03:06

## 2024-06-12 RX ADMIN — CEFAZOLIN 2 G: 330 INJECTION, POWDER, FOR SOLUTION INTRAMUSCULAR; INTRAVENOUS at 03:06

## 2024-06-12 RX ADMIN — SODIUM CHLORIDE, SODIUM LACTATE, POTASSIUM CHLORIDE, AND CALCIUM CHLORIDE: .6; .31; .03; .02 INJECTION, SOLUTION INTRAVENOUS at 03:06

## 2024-06-12 NOTE — TRANSFER OF CARE
"Anesthesia Transfer of Care Note    Patient: Abril Hendrickson    Procedure(s) Performed: Procedure(s) (LRB):  THYROIDECTOMY (Bilateral)    Patient location: PACU    Anesthesia Type: general    Transport from OR: Transported from OR on room air with adequate spontaneous ventilation    Post pain: adequate analgesia    Post assessment: no apparent anesthetic complications and tolerated procedure well    Post vital signs: stable    Level of consciousness: awake    Nausea/Vomiting: no nausea/vomiting    Complications: none    Transfer of care protocol was followed    Last vitals: Visit Vitals  /85 (BP Location: Right arm, Patient Position: Lying)   Pulse 70   Temp 36.7 °C (98 °F) (Oral)   Resp 17   Ht 5' 5" (1.651 m)   Wt 113.9 kg (251 lb)   SpO2 98%   Breastfeeding No   BMI 41.77 kg/m²     "

## 2024-06-12 NOTE — ANESTHESIA PROCEDURE NOTES
Intubation    Date/Time: 6/12/2024 3:26 PM    Performed by: Georgie Ware CRNA  Authorized by: Constantino Erwin MD    Intubation:     Induction:  Intravenous    Intubated:  Postinduction    Mask Ventilation:  N/a    Attempts:  1    Attempted By:  CRNA    Method of Intubation:  Video laryngoscopy    Blade:  Mac 3    Laryngeal View Grade: Grade I - full view of cords      Difficult Airway Encountered?: No      Complications:  None    Airway Device:  Oral endotracheal tube    Airway Device Size:  7.5    Style/Cuff Inflation:  Cuffed    Secured at:  The lips    Placement Verified By:  Capnometry    Complicating Factors:  None    Findings Post-Intubation:  BS equal bilateral and atraumatic/condition of teeth unchanged

## 2024-06-12 NOTE — ANESTHESIA PREPROCEDURE EVALUATION
2024  Abril Hendrickson is a 63 y.o., female.      Patient Active Problem List   Diagnosis    Morbid obesity with BMI of 40.0-44.9, adult    Allergic rhinosinusitis    Hidradenitis suppurativa    Essential hypertension    Hyperlipidemia    Slow transit constipation    Obesity (BMI 35.0-39.9 without comorbidity)    Neck pain    Upper extremity weakness    Chronic idiopathic constipation    Vaginal atrophy    RLS (restless legs syndrome)    Low vitamin D level    Recurrent sinusitis    Type 2 diabetes mellitus without complication, without long-term current use of insulin    Personal history of colonic polyps    Multinodular goiter       Past Surgical History:   Procedure Laterality Date    CARPAL TUNNEL RELEASE Right 1996     SECTION      x 2,  &     COLONOSCOPY N/A 2021    Procedure: COLONOSCOPY;  Surgeon: Jhoan Mendoza MD;  [facility];  Service: Endoscopy;  Laterality: N/A;    CYST REMOVAL      chest area, left and right underarms    HYSTERECTOMY      OOPHORECTOMY          Tobacco Use:  The patient  reports that she has never smoked. She has never used smokeless tobacco.     Results for orders placed or performed during the hospital encounter of 24   EKG 12-lead    Collection Time: 24  2:25 PM   Result Value Ref Range    QRS Duration 78 ms    OHS QTC Calculation 429 ms    Narrative    Test Reason : E04.2,E06.9,    Vent. Rate : 066 BPM     Atrial Rate : 066 BPM     P-R Int : 182 ms          QRS Dur : 078 ms      QT Int : 410 ms       P-R-T Axes : 045 -08 014 degrees     QTc Int : 429 ms    Normal sinus rhythm  Nonspecific T wave abnormality  Abnormal ECG  When compared with ECG of 09-MAY-2017 09:09,  No significant change was found  Confirmed by Eliu MALDONADO, Simone JOSHUA ([id number]) on 2024 8:04:01 PM    Referred By:             Confirmed By:Simone JOSHUA  Eliu MALDONADO             Lab Results   Component Value Date    WBC 10.00 06/11/2024    HGB 11.6 (L) 06/11/2024    HCT 36.6 (L) 06/11/2024    MCV 96 06/11/2024     06/11/2024     BMP  Lab Results   Component Value Date     06/11/2024    K 3.8 06/11/2024     06/11/2024    CO2 26 06/11/2024    BUN 13 06/11/2024    CREATININE 0.9 06/11/2024    CALCIUM 9.4 06/11/2024    ANIONGAP 9 06/11/2024    GLU 86 06/11/2024     (H) 07/13/2023     01/12/2023       No results found for this or any previous visit.          Pre-op Assessment    I have reviewed the Patient Summary Reports.     I have reviewed the Nursing Notes. I have reviewed the NPO Status.   I have reviewed the Medications.     Review of Systems  Anesthesia Hx:  No problems with previous Anesthesia             Denies Family Hx of Anesthesia complications.    Denies Personal Hx of Anesthesia complications.                    Social:  Non-Smoker       Hematology/Oncology:  Hematology Normal   Oncology Normal                                   EENT/Dental:  EENT/Dental Normal           Cardiovascular:     Hypertension              ECG has been reviewed.                          Pulmonary:  Pulmonary Normal                       Renal/:  Renal/ Normal                 Hepatic/GI:     GERD             Musculoskeletal:  Musculoskeletal Normal                Neurological:  Neurology Normal                                      Endocrine:  Diabetes, type 2         Morbid Obesity / BMI > 40  Psych:  Psychiatric Normal                    Physical Exam  General: Well nourished, Cooperative, Alert and Oriented    Airway:  Mallampati: II   Mouth Opening: Normal  TM Distance: Normal  Tongue: Normal  Neck ROM: Normal ROM    Dental:  Intact    Chest/Lungs:  Clear to auscultation    Heart:  Rate: Normal  Rhythm: Regular Rhythm  Sounds: Normal        Anesthesia Plan  Type of Anesthesia, risks & benefits discussed:    Anesthesia Type: Gen ETT  Intra-op  Monitoring Plan: Standard ASA Monitors  Post Op Pain Control Plan: multimodal analgesia  Induction:  IV  Airway Plan: Video and Direct  Informed Consent: Informed consent signed with the Patient and all parties understand the risks and agree with anesthesia plan.  All questions answered.   ASA Score: 3    Ready For Surgery From Anesthesia Perspective.     .

## 2024-06-13 VITALS
HEIGHT: 65 IN | TEMPERATURE: 99 F | SYSTOLIC BLOOD PRESSURE: 151 MMHG | OXYGEN SATURATION: 99 % | HEART RATE: 72 BPM | WEIGHT: 253.31 LBS | DIASTOLIC BLOOD PRESSURE: 83 MMHG | RESPIRATION RATE: 18 BRPM | BODY MASS INDEX: 42.2 KG/M2

## 2024-06-13 LAB
ANION GAP SERPL CALC-SCNC: 8 MMOL/L (ref 8–16)
BUN SERPL-MCNC: 12 MG/DL (ref 8–23)
CALCIUM SERPL-MCNC: 9 MG/DL (ref 8.7–10.5)
CHLORIDE SERPL-SCNC: 103 MMOL/L (ref 95–110)
CO2 SERPL-SCNC: 26 MMOL/L (ref 23–29)
CREAT SERPL-MCNC: 0.7 MG/DL (ref 0.5–1.4)
EST. GFR  (NO RACE VARIABLE): >60 ML/MIN/1.73 M^2
GLUCOSE SERPL-MCNC: 113 MG/DL (ref 70–110)
OHS QRS DURATION: 88 MS
OHS QTC CALCULATION: 414 MS
POTASSIUM SERPL-SCNC: 3.9 MMOL/L (ref 3.5–5.1)
SODIUM SERPL-SCNC: 137 MMOL/L (ref 136–145)
TROPONIN I SERPL HS-MCNC: 3.8 PG/ML (ref 0–14.9)

## 2024-06-13 PROCEDURE — 63600175 PHARM REV CODE 636 W HCPCS: Performed by: SURGERY

## 2024-06-13 PROCEDURE — 93010 ELECTROCARDIOGRAM REPORT: CPT | Mod: ,,, | Performed by: INTERNAL MEDICINE

## 2024-06-13 PROCEDURE — 84484 ASSAY OF TROPONIN QUANT: CPT | Performed by: SURGERY

## 2024-06-13 PROCEDURE — 94799 UNLISTED PULMONARY SVC/PX: CPT

## 2024-06-13 PROCEDURE — 99900031 HC PATIENT EDUCATION (STAT)

## 2024-06-13 PROCEDURE — 25000242 PHARM REV CODE 250 ALT 637 W/ HCPCS: Performed by: SURGERY

## 2024-06-13 PROCEDURE — 80048 BASIC METABOLIC PNL TOTAL CA: CPT | Performed by: SURGERY

## 2024-06-13 PROCEDURE — 36415 COLL VENOUS BLD VENIPUNCTURE: CPT | Performed by: SURGERY

## 2024-06-13 PROCEDURE — 94761 N-INVAS EAR/PLS OXIMETRY MLT: CPT

## 2024-06-13 PROCEDURE — 25000003 PHARM REV CODE 250: Performed by: SURGERY

## 2024-06-13 PROCEDURE — 93005 ELECTROCARDIOGRAM TRACING: CPT | Performed by: INTERNAL MEDICINE

## 2024-06-13 RX ORDER — LEVOTHYROXINE SODIUM 125 UG/1
125 TABLET ORAL
Qty: 30 TABLET | Refills: 11 | Status: SHIPPED | OUTPATIENT
Start: 2024-06-13 | End: 2025-06-13

## 2024-06-13 RX ORDER — ONDANSETRON 4 MG/1
4 TABLET, ORALLY DISINTEGRATING ORAL EVERY 6 HOURS PRN
Status: DISCONTINUED | OUTPATIENT
Start: 2024-06-13 | End: 2024-06-13 | Stop reason: HOSPADM

## 2024-06-13 RX ORDER — HYDROCODONE BITARTRATE AND ACETAMINOPHEN 5; 325 MG/1; MG/1
1 TABLET ORAL EVERY 6 HOURS PRN
Qty: 20 TABLET | Refills: 0 | Status: SHIPPED | OUTPATIENT
Start: 2024-06-13

## 2024-06-13 RX ADMIN — MUPIROCIN 1 G: 20 OINTMENT TOPICAL at 09:06

## 2024-06-13 RX ADMIN — HYDROMORPHONE HYDROCHLORIDE 1 MG: 1 INJECTION, SOLUTION INTRAMUSCULAR; INTRAVENOUS; SUBCUTANEOUS at 08:06

## 2024-06-13 RX ADMIN — FLUTICASONE PROPIONATE 50 MCG: 50 SPRAY, METERED NASAL at 09:06

## 2024-06-13 RX ADMIN — HYDROMORPHONE HYDROCHLORIDE 1 MG: 1 INJECTION, SOLUTION INTRAMUSCULAR; INTRAVENOUS; SUBCUTANEOUS at 05:06

## 2024-06-13 RX ADMIN — HYDROCHLOROTHIAZIDE 25 MG: 12.5 TABLET ORAL at 09:06

## 2024-06-13 NOTE — PLAN OF CARE
06/13/24 1410   NIEVES Message   Medicare Outpatient and Observation Notification regarding financial responsibility Given to patient/caregiver;Explained to patient/caregiver;Signed/date by patient/caregiver   Date NIEVES was signed 06/13/24   Time NIEVES was signed 0207     Pt was explained NIEVES. Pt verbalized understanding of NIEVES and signed. NIEVES scanned to .

## 2024-06-13 NOTE — PLAN OF CARE
Patient cleared for discharge by case management pending discharge orders.    Patient already has follow up with surgeon scheduled.        06/13/24 1443   Final Note   Assessment Type Final Discharge Note   Anticipated Discharge Disposition Home   Hospital Resources/Appts/Education Provided Appointment suggestion unavailable

## 2024-06-13 NOTE — NURSING
Nurses Note -- 4 Eyes      6/12/2024   21:05 PM      Skin assessed during: Admit      [] No Altered Skin Integrity Present    []Prevention Measures Documented      [x] Yes- Altered Skin Integrity Present or Discovered   [] LDA Added if Not in Epic (Describe Wound)   [x] New Altered Skin Integrity was Present on Admit and Documented in LDA   [] Wound Image Taken    Wound Care Consulted? No    Attending Nurse:  Jessica Valente RN/Staff Member:   Gregory

## 2024-06-13 NOTE — PLAN OF CARE
[facility]  Initial Discharge Assessment       Primary Care Provider: Leanne Gomez APRN,FNP-C    Admission Diagnosis: Multinodular goiter [E04.2]  Thyroiditis [E06.9]    Admission Date: 6/12/2024  Expected Discharge Date: 6/13/2024   met with Pt at bedside to complete discharge assessment. Pt AAOx4s. Demographics, PCP, and insurance verified. No home health. No dialysis. Pt reports ability to complete ADLs without assistance. Pt verbalized plan to discharge home via family transport. Pt has no other needs to be addressed at this time.    Transition of Care Barriers: None    Payor: BCBS [place] MEDICARE / Plan: [employer] [place] / Product Type: Medicare Advantage /     Extended Emergency Contact Information  Primary Emergency Contact: Christopher Hendrickson  Address: [address] [place] [address] [facility]  Home Phone: [phone]  Mobile Phone: [phone]  Relation: Spouse  Secondary Emergency Contact: Rafta Hendrickson   [place]  Mobile Phone: [phone]  Relation: Daughter    Discharge Plan A: Home with family  Discharge Plan B: Home with family      [facility] - [place] - [address] [facility]  [address] [facility] [address]  Phone: [phone] Fax: [phone]    [id number] - [place] - [address] [facility]  [address]  [place] [address]  Phone: [phone] Fax: [phone]      Initial Assessment (most recent)       Adult Discharge Assessment - 06/13/24 1411          Discharge Assessment    Assessment Type Discharge Planning Assessment     Confirmed/corrected address, phone number and insurance Yes     Confirmed Demographics Correct on Facesheet     Source of Information patient     Does patient/caregiver understand observation status Yes     Communicated AUDREY with patient/caregiver Date not available/Unable to determine     Reason For  Admission Multinodular goiter     People in Home spouse     Do you expect to return to your current living situation? Yes     Do you have help at home or someone to help you manage your care at home? Yes     Who are your caregiver(s) and their phone number(s)? Christopher Hendrickson (Spouse)  [phone] (     Prior to hospitilization cognitive status: Alert/Oriented     Current cognitive status: Alert/Oriented     Walking or Climbing Stairs Difficulty no     Dressing/Bathing Difficulty no     Home Accessibility wheelchair accessible     Home Layout Able to live on 1st floor     Equipment Currently Used at Home none     Patient currently being followed by outpatient case management? No     Do you currently have service(s) that help you manage your care at home? No     Do you take prescription medications? Yes     Do you have prescription coverage? Yes     Coverage Payor: BCBS [place] MEDICARE - [facility] -     Do you have any problems affording any of your prescribed medications? No     Is the patient taking medications as prescribed? no     Who is going to help you get home at discharge? Christopher Hendrickson (Spouse)  [phone] (     How do you get to doctors appointments? car, drives self;family or friend will provide     Are you on dialysis? No     Do you take coumadin? No     Discharge Plan A Home with family     Discharge Plan B Home with family     DME Needed Upon Discharge  none     Discharge Plan discussed with: Patient     Transition of Care Barriers None

## 2024-06-13 NOTE — BRIEF OP NOTE
[facility]  Brief Operative Note    SUMMARY     Surgery Date: 6/12/2024     Surgeons and Role:     * Javy Petersen III, MD - Primary    Assisting Surgeon: None    Pre-op Diagnosis:  Multinodular goiter [E04.2]  Thyroiditis [E06.9]    Post-op Diagnosis:  Post-Op Diagnosis Codes:     * Multinodular goiter [E04.2]     * Thyroiditis [E06.9]    Procedure(s) (LRB):  THYROIDECTOMY (Bilateral)  Intraoperative nerve stimulator monitoring.    Anesthesia: General    Implants:  Implant Name Type Inv. Item Serial No.  Lot No. LRB No. Used Action   MEMBRANE AMNIOFIX 4X6CM - [id number] Amniotic MEMBRANE AMNIOFIX 4X6CM  [employer] [id number] Bilateral 1 Implanted       Operative Findings: Patient was taken the operating room and transferred to the operating room table in supine position. She was given general anesthesia and intubated. She was positioned with the neck slightly extended. The patient's neck sterilely prepped and draped. A collar incision was made about 2 fingerbreadth's above the clavicular head. Dissection was carried down through the skin and subcutaneous tissue. The platysma was entered. Sub platysmal flaps were raised superiorly and inferiorly. The strap muscles were encountered. The median raphae was entered. Dissection was then carried down to the surface of the thyroid. The strap muscles on the right dissected off the thyroid gland and retracted laterally. The right thyroid gland was rolled medially mobilizing the right thyroid lobe. The middle thyroid vein was located and was taken using Liga Sure device. Once the gland was rotated more medially the upper and lower parathyroid glands were located as well as recurrent laryngeal nerve. Return attention to the upper pole. Vessels of the upper pole were divided using Liga Sure and also several medium clips. Then turned my attention to the lower pole the lower pole vessels were also divided with ligature and several  clips. The gland was then sharply dissected off the anterior surface of the trachea to the midline. I then turned my attention to the left thyroid lobe.  The left thyroid lobe was very large.  The exact maneuvers were performed on the left. The middle thyroid vein was divided followed by the upper and lower pole vessels.  Nerve identified visually and confirmed with the nerve stimulator.  The left lower parathyroid appeared very large.  It was intimately associated with our thyroid.  I took it enblock and sent as a separate specimen. The gland was then completely taken off the anterior surface of the thyroid. The gland was submitted to pathology. Hemostasis was achieved.  A sheet of amniotic AmnioFix allograft was cut in half and placed over both nerves bilaterally.  A sheet of Surgicel was placed in the left and right neck. There was no evidence of bleeding. The strap muscles were re approximated using interrupted Vicryl suture. The platysma was re approximated using interrupted 4 Monocryl suture. The skin was closed using 4-0 Monocryl running subcuticular suture. Incision was bandaged. She was extubated. She was brought to the recovery room in stable condition. All lap and instrument counts were correct     Estimated Blood Loss: 25 mL    Estimated Blood Loss has been documented.       Complications none.  Specimens:   Specimen (24h ago, onward)       Start     Ordered    06/12/24 1720  Specimen to Pathology - Surgery  Once        Comments: Pre-op Diagnosis: Multinodular goiter [E04.2]Thyroiditis [E06.9]Procedure(s):THYROIDECTOMY Number of specimens: 3Name of specimens: 1. Total Thyroid, marking stitch Long double = left lower / Short Double = left upperShort single stitch = Right Upper Lobe  /Long Single Stitch = Right Lower Lobe2. Left Parathyroid Tissue3. Left Thyroid Nodule     Question:  Release to patient  Answer:  Immediate    06/12/24 3621                    [id number]

## 2024-06-13 NOTE — OP NOTE
Malou Mcclelland is a 2 month old female who was brought in for this visit. History was provided by the caregiver  HPI:   Patient presents with:   Well Baby    Feedings: nursing + supplementing with formula - 4 oz per feeding; vitamin D daily    Developme Surgery Date: 6/12/2024     Surgeons and Role:     * Javy Petersen III, MD - Primary    Assisting Surgeon: None    Pre-op Diagnosis:  Multinodular goiter [E04.2]  Thyroiditis [E06.9]    Post-op Diagnosis:  Post-Op Diagnosis Codes:     * Multinodular goiter [E04.2]     * Thyroiditis [E06.9]    Procedure(s) (LRB):  THYROIDECTOMY (Bilateral)  Intraoperative nerve stimulator monitoring.  Placement of amniotic allograft over both recurrent laryngeal nerves    Anesthesia: General    Implants:  Implant Name Type Inv. Item Serial No.  Lot No. LRB No. Used Action   MEMBRANE AMNIOFIX 4X6CM - [id number] Amniotic MEMBRANE AMNIOFIX 4X6CM  [employer] [id number] Bilateral 1 Implanted       Operative Findings: Patient was taken the operating room and transferred to the operating room table in supine position. She was given general anesthesia and intubated. She was positioned with the neck slightly extended. The patient's neck sterilely prepped and draped. A collar incision was made about 2 fingerbreadth's above the clavicular head. Dissection was carried down through the skin and subcutaneous tissue. The platysma was entered. Sub platysmal flaps were raised superiorly and inferiorly. The strap muscles were encountered. The median raphae was entered. Dissection was then carried down to the surface of the thyroid. The strap muscles on the right dissected off the thyroid gland and retracted laterally. The right thyroid gland was rolled medially mobilizing the right thyroid lobe. The middle thyroid vein was located and was taken using Liga Sure device. Once the gland was rotated more medially the upper and lower parathyroid glands were located as well as recurrent laryngeal nerve. Return attention to the upper pole. Vessels of the upper pole were divided using Liga Sure and also several medium clips. Then turned my attention to the lower pole the lower pole vessels were also divided with ligature and  folds; equal leg length; full abduction of hips with negative Galeazzi  Musculoskeletal: No abnormalities noted  Extremities: No edema, cyanosis, or clubbing  Neurological: Appropriate for age reflexes; normal tone    ASSESSMENT/PLAN:   Noemi Abad was seen to several clips. The gland was then sharply dissected off the anterior surface of the trachea to the midline. I then turned my attention to the left thyroid lobe.  The left thyroid lobe was very large.  The exact maneuvers were performed on the left. The middle thyroid vein was divided followed by the upper and lower pole vessels.  Nerve identified visually and confirmed with the nerve stimulator.  The left lower parathyroid appeared very large.  It was intimately associated with our thyroid.  I took it enblock and sent as a separate specimen. The gland was then completely taken off the anterior surface of the thyroid. The gland was submitted to pathology. Hemostasis was achieved.  A sheet of amniotic AmnioFix allograft was cut in half and placed over both nerves bilaterally.  A sheet of Surgicel was placed in the left and right neck. There was no evidence of bleeding. The strap muscles were re approximated using interrupted Vicryl suture. The platysma was re approximated using interrupted 4 Monocryl suture. The skin was closed using 4-0 Monocryl running subcuticular suture. Incision was bandaged. She was extubated. She was brought to the recovery room in stable condition. All lap and instrument counts were correct     Estimated Blood Loss: 25 mL    Estimated Blood Loss has been documented.       Complications none.  Specimens:   Specimen (24h ago, onward)       Start     Ordered    06/12/24 1720  Specimen to Pathology - Surgery  Once        Comments: Pre-op Diagnosis: Multinodular goiter [E04.2]Thyroiditis [E06.9]Procedure(s):THYROIDECTOMY Number of specimens: 3Name of specimens: 1. Total Thyroid, marking stitch Long double = left lower / Short Double = left upperShort single stitch = Right Upper Lobe  /Long Single Stitch = Right Lower Lobe2. Left Parathyroid Tissue3. Left Thyroid Nodule     Question:  Release to patient  Answer:  Immediate    06/12/24 9514                    [id number]       for fever or fussiness    Parental concerns addressed  Call us with any questions/concerns    See back at 6 mo of age    Alycia Schumacher MD  8/20/2019

## 2024-06-13 NOTE — PROGRESS NOTES
[facility]  General Surgery  Progress Note    Subjective:     Interval History: POD 1 total thyroidectomy. She is awake and alert. Pain mild. Voice is mildly hoarse. Calcium is 9.0 this morning. She also had complaint of substernal chest pain radiating the left arm and into the left neck lasting a few minutes this morning. She had no shortness of breath. Vitals have been OK.   She reports experiencing same sensation once before a few months ago. No known history of cardiac issues.     Post-Op Info:  Procedure(s) (LRB):  THYROIDECTOMY (Bilateral)   1 Day Post-Op      Medications:  Continuous Infusions:  Scheduled Meds:   fluticasone propionate  1 spray Each Nostril Daily    hydroCHLOROthiazide  25 mg Oral Daily    mupirocin  1 g Nasal BID     PRN Meds:  Current Facility-Administered Medications:     cyclobenzaprine, 5 mg, Oral, TID PRN    HYDROcodone-acetaminophen, 1 tablet, Oral, Q4H PRN    HYDROmorphone, 1 mg, Intravenous, Q4H PRN    ondansetron, 4 mg, Oral, Q6H PRN     Objective:     Vital Signs (Most Recent):  Temp: 97.6 °F (36.4 °C) (06/13/24 0745)  Pulse: 71 (06/13/24 0745)  Resp: 16 (06/13/24 0745)  BP: 136/79 (06/13/24 0745)  SpO2: 96 % (06/13/24 0745) Vital Signs (24h Range):  Temp:  [97.1 °F (36.2 °C)-98.2 °F (36.8 °C)] 97.6 °F (36.4 °C)  Pulse:  [67-86] 71  Resp:  [10-22] 16  SpO2:  [95 %-100 %] 96 %  BP: (136-167)/() 136/79       Intake/Output Summary (Last 24 hours) at 6/13/2024 1008  Last data filed at 6/13/2024 0619  Gross per 24 hour   Intake 2620 ml   Output 125 ml   Net 2495 ml       Physical Exam  Constitutional:       General: She is awake. She is not in acute distress.     Appearance: She is not toxic-appearing.   Neck:        Comments: Incision is clean piero an in tact.   No hematoma   Minor Swelling so far - appropriate.   Pulmonary:      Effort: Pulmonary effort is normal. No respiratory distress.   Neurological:      Mental Status: She is alert and oriented to person,  place, and time.   Psychiatric:         Behavior: Behavior is cooperative.         Significant Labs:  CBC:   Recent Labs   Lab 06/11/24  1605   WBC 10.00   RBC 3.81*   HGB 11.6*   HCT 36.6*      MCV 96   MCH 30.4   MCHC 31.7*     CMP:   Recent Labs   Lab 06/11/24  1605 06/13/24  0849   GLU 86 113*   CALCIUM 9.4 9.0   ALBUMIN 4.4  --    PROT 7.4  --     137   K 3.8 3.9   CO2 26 26    103   BUN 13 12   CREATININE 0.9 0.7   ALKPHOS 82  --    ALT 24  --    AST 20  --    BILITOT 0.4  --        Significant Diagnostics:  I have reviewed all pertinent imaging results/findings within the past 24 hours.    Assessment/Plan:     Active Diagnoses:    Diagnosis Date Noted POA    PRINCIPAL PROBLEM:  Multinodular goiter [E04.2] 06/12/2024 Yes      Problems Resolved During this Admission:     -incision looks good. No hematoma. Calcium is within normal. Voice is a little hoarse. Doing well from a thyroidectomy standpoint and would be able to go home today. She did complain of the short lasting chest pain radiating to her arm. Will order EKG and troponins. Will follow up shortly     Javy Petersen III, MD  General Surgery  [facility]

## 2024-06-14 ENCOUNTER — PATIENT MESSAGE (OUTPATIENT)
Dept: SURGERY | Facility: CLINIC | Age: 64
End: 2024-06-14
Payer: MEDICARE

## 2024-06-17 NOTE — ANESTHESIA POSTPROCEDURE EVALUATION
Anesthesia Post Evaluation    Patient: Abril Hendrickson    Procedure(s) Performed: Procedure(s) (LRB):  THYROIDECTOMY (Bilateral)    Final Anesthesia Type: general      Patient location during evaluation: PACU  Patient participation: Yes- Able to Participate  Level of consciousness: awake and alert  Post-procedure vital signs: reviewed and stable  Pain management: adequate  Airway patency: patent    PONV status at discharge: No PONV  Anesthetic complications: no      Cardiovascular status: hemodynamically stable  Respiratory status: unassisted, spontaneous ventilation and room air  Hydration status: euvolemic  Follow-up not needed.              Vitals Value Taken Time   /83 06/13/24 1653   Temp 37 °C (98.6 °F) 06/13/24 1653   Pulse 72 06/13/24 1653   Resp 18 06/13/24 1709   SpO2 99 % 06/13/24 1653         Event Time   Out of Recovery 20:57:52         Pain/Joelle Score: No data recorded

## 2024-06-22 LAB
OHS QRS DURATION: 88 MS
OHS QTC CALCULATION: 414 MS

## 2024-06-27 ENCOUNTER — OFFICE VISIT (OUTPATIENT)
Dept: SURGERY | Facility: CLINIC | Age: 64
End: 2024-06-27
Payer: MEDICARE

## 2024-06-27 VITALS — TEMPERATURE: 98 F | DIASTOLIC BLOOD PRESSURE: 74 MMHG | HEART RATE: 72 BPM | SYSTOLIC BLOOD PRESSURE: 147 MMHG

## 2024-06-27 DIAGNOSIS — E04.2 MULTINODULAR GOITER: Primary | ICD-10-CM

## 2024-06-27 PROCEDURE — 3077F SYST BP >= 140 MM HG: CPT | Mod: CPTII,S$GLB,, | Performed by: SURGERY

## 2024-06-27 PROCEDURE — 1160F RVW MEDS BY RX/DR IN RCRD: CPT | Mod: CPTII,S$GLB,, | Performed by: SURGERY

## 2024-06-27 PROCEDURE — 99024 POSTOP FOLLOW-UP VISIT: CPT | Mod: S$GLB,,, | Performed by: SURGERY

## 2024-06-27 PROCEDURE — 99999 PR PBB SHADOW E&M-EST. PATIENT-LVL III: CPT | Mod: PBBFAC,,, | Performed by: SURGERY

## 2024-06-27 PROCEDURE — 1159F MED LIST DOCD IN RCRD: CPT | Mod: CPTII,S$GLB,, | Performed by: SURGERY

## 2024-06-27 PROCEDURE — 3078F DIAST BP <80 MM HG: CPT | Mod: CPTII,S$GLB,, | Performed by: SURGERY

## 2024-06-28 ENCOUNTER — PATIENT MESSAGE (OUTPATIENT)
Dept: FAMILY MEDICINE | Facility: CLINIC | Age: 64
End: 2024-06-28
Payer: MEDICARE

## 2024-07-01 ENCOUNTER — PATIENT MESSAGE (OUTPATIENT)
Dept: FAMILY MEDICINE | Facility: CLINIC | Age: 64
End: 2024-07-01
Payer: MEDICARE

## 2024-07-03 ENCOUNTER — PATIENT MESSAGE (OUTPATIENT)
Dept: FAMILY MEDICINE | Facility: CLINIC | Age: 64
End: 2024-07-03
Payer: MEDICARE

## 2024-07-03 ENCOUNTER — PATIENT MESSAGE (OUTPATIENT)
Dept: SURGERY | Facility: CLINIC | Age: 64
End: 2024-07-03
Payer: MEDICARE

## 2024-07-03 DIAGNOSIS — Z12.31 ENCOUNTER FOR SCREENING MAMMOGRAM FOR MALIGNANT NEOPLASM OF BREAST: Primary | ICD-10-CM

## 2024-07-03 RX ORDER — LEVOTHYROXINE SODIUM 125 UG/1
125 TABLET ORAL
Qty: 30 TABLET | Refills: 11 | Status: SHIPPED | OUTPATIENT
Start: 2024-07-03 | End: 2025-07-03

## 2024-07-03 NOTE — PROGRESS NOTES
Subjective:       Patient ID: Abril Hendrickson is a 64 y.o. female.    Chief Complaint: Post-op Evaluation      HPI:  64-year-old female returns for postop visit.  She is status post total thyroidectomy for goiter, compressive symptoms.  Pathology returned benign.  She states she is feeling well.  Voice is strong and morning but does have some fatigue toward the end of the day.  Eating well.  Breathing well.  Compressive symptoms are improved.  One parathyroid was removed.  It was benign without any evidence of parathyroid adenoma.  Calcium 9.0 on discharge.    1. THYROID, THYROIDECTOMY:     - MULTINODULAR GOITER.     2. LEFT PARATHYROID TISSUE:     - BENIGN NORMOCELLULAR PARATHYROID GLAND.     3. LEFT THYROID NODULE:     - MULTINODULAR GOITER.     Review of Systems    Objective:      Physical Exam  Constitutional:       General: She is not in acute distress.  Neck:        Comments: Incision is clean dry and intact.  Swelling is mild.  Skin:     Comments: Incision is clean, dry and intact   There is no evidence of infection, hematoma or seroma.    Neurological:      Mental Status: She is alert and oriented to person, place, and time.   Psychiatric:         Behavior: Behavior is cooperative.         Assessment/Plan:   Multinodular goiter      Status post total thyroidectomy.  Compressive symptoms are much improved.  No evidence of complications.  Incisions healing well.  She is compliant with her Synthroid.  She has endocrine follow-up in the upcoming weeks.  Calcium was 9.0 on discharge.  This was slightly less than her admission 9.4.  Will reorder BMP for routine check.  She has no symptoms of hypocalcemia

## 2024-07-05 ENCOUNTER — LAB VISIT (OUTPATIENT)
Dept: LAB | Facility: HOSPITAL | Age: 64
End: 2024-07-05
Attending: NURSE PRACTITIONER
Payer: MEDICARE

## 2024-07-05 DIAGNOSIS — E11.65 TYPE 2 DIABETES MELLITUS WITH HYPERGLYCEMIA, WITHOUT LONG-TERM CURRENT USE OF INSULIN: ICD-10-CM

## 2024-07-05 DIAGNOSIS — I10 ESSENTIAL HYPERTENSION: ICD-10-CM

## 2024-07-05 DIAGNOSIS — R60.0 BILATERAL LOWER EXTREMITY EDEMA: ICD-10-CM

## 2024-07-05 DIAGNOSIS — J32.9 RECURRENT SINUSITIS: ICD-10-CM

## 2024-07-05 DIAGNOSIS — E04.2 MULTINODULAR GOITER: ICD-10-CM

## 2024-07-05 DIAGNOSIS — R22.1 LOCALIZED SWELLING, MASS OR LUMP OF NECK: ICD-10-CM

## 2024-07-05 DIAGNOSIS — E78.2 MIXED HYPERLIPIDEMIA: ICD-10-CM

## 2024-07-05 LAB
ALBUMIN SERPL BCP-MCNC: 3.9 G/DL (ref 3.5–5.2)
ALBUMIN/CREAT UR: 8.6 UG/MG (ref 0–30)
ALP SERPL-CCNC: 96 U/L (ref 55–135)
ALT SERPL W/O P-5'-P-CCNC: 29 U/L (ref 10–44)
ANION GAP SERPL CALC-SCNC: 10 MMOL/L (ref 8–16)
ANION GAP SERPL CALC-SCNC: 10 MMOL/L (ref 8–16)
AST SERPL-CCNC: 24 U/L (ref 10–40)
BASOPHILS # BLD AUTO: 0.03 K/UL (ref 0–0.2)
BASOPHILS NFR BLD: 0.4 % (ref 0–1.9)
BILIRUB SERPL-MCNC: 0.3 MG/DL (ref 0.1–1)
BUN SERPL-MCNC: 12 MG/DL (ref 8–23)
BUN SERPL-MCNC: 12 MG/DL (ref 8–23)
CALCIUM SERPL-MCNC: 9.6 MG/DL (ref 8.7–10.5)
CALCIUM SERPL-MCNC: 9.6 MG/DL (ref 8.7–10.5)
CHLORIDE SERPL-SCNC: 106 MMOL/L (ref 95–110)
CHLORIDE SERPL-SCNC: 106 MMOL/L (ref 95–110)
CHOLEST SERPL-MCNC: 271 MG/DL (ref 120–199)
CHOLEST/HDLC SERPL: 5.8 {RATIO} (ref 2–5)
CO2 SERPL-SCNC: 23 MMOL/L (ref 23–29)
CO2 SERPL-SCNC: 23 MMOL/L (ref 23–29)
CREAT SERPL-MCNC: 0.8 MG/DL (ref 0.5–1.4)
CREAT SERPL-MCNC: 0.8 MG/DL (ref 0.5–1.4)
CREAT UR-MCNC: 105 MG/DL (ref 15–325)
DIFFERENTIAL METHOD BLD: ABNORMAL
EOSINOPHIL # BLD AUTO: 0.2 K/UL (ref 0–0.5)
EOSINOPHIL NFR BLD: 2.5 % (ref 0–8)
ERYTHROCYTE [DISTWIDTH] IN BLOOD BY AUTOMATED COUNT: 13.1 % (ref 11.5–14.5)
EST. GFR  (NO RACE VARIABLE): >60 ML/MIN/1.73 M^2
EST. GFR  (NO RACE VARIABLE): >60 ML/MIN/1.73 M^2
ESTIMATED AVG GLUCOSE: 97 MG/DL (ref 68–131)
GLUCOSE SERPL-MCNC: 107 MG/DL (ref 70–110)
GLUCOSE SERPL-MCNC: 107 MG/DL (ref 70–110)
HBA1C MFR BLD: 5 % (ref 4–5.6)
HCT VFR BLD AUTO: 38.1 % (ref 37–48.5)
HDLC SERPL-MCNC: 47 MG/DL (ref 40–75)
HDLC SERPL: 17.3 % (ref 20–50)
HGB BLD-MCNC: 11.9 G/DL (ref 12–16)
IMM GRANULOCYTES # BLD AUTO: 0.04 K/UL (ref 0–0.04)
IMM GRANULOCYTES NFR BLD AUTO: 0.5 % (ref 0–0.5)
LDLC SERPL CALC-MCNC: 194.6 MG/DL (ref 63–159)
LYMPHOCYTES # BLD AUTO: 1.7 K/UL (ref 1–4.8)
LYMPHOCYTES NFR BLD: 19.8 % (ref 18–48)
MCH RBC QN AUTO: 30.1 PG (ref 27–31)
MCHC RBC AUTO-ENTMCNC: 31.2 G/DL (ref 32–36)
MCV RBC AUTO: 96 FL (ref 82–98)
MICROALBUMIN UR DL<=1MG/L-MCNC: 9 UG/ML
MONOCYTES # BLD AUTO: 0.6 K/UL (ref 0.3–1)
MONOCYTES NFR BLD: 7 % (ref 4–15)
NEUTROPHILS # BLD AUTO: 5.8 K/UL (ref 1.8–7.7)
NEUTROPHILS NFR BLD: 69.8 % (ref 38–73)
NONHDLC SERPL-MCNC: 224 MG/DL
NRBC BLD-RTO: 0 /100 WBC
PLATELET # BLD AUTO: 410 K/UL (ref 150–450)
PMV BLD AUTO: 11 FL (ref 9.2–12.9)
POTASSIUM SERPL-SCNC: 3.9 MMOL/L (ref 3.5–5.1)
POTASSIUM SERPL-SCNC: 3.9 MMOL/L (ref 3.5–5.1)
PROT SERPL-MCNC: 7.5 G/DL (ref 6–8.4)
RBC # BLD AUTO: 3.96 M/UL (ref 4–5.4)
SODIUM SERPL-SCNC: 139 MMOL/L (ref 136–145)
SODIUM SERPL-SCNC: 139 MMOL/L (ref 136–145)
T4 FREE SERPL-MCNC: 1.09 NG/DL (ref 0.71–1.51)
TRIGL SERPL-MCNC: 147 MG/DL (ref 30–150)
TSH SERPL DL<=0.005 MIU/L-ACNC: 0.47 UIU/ML (ref 0.4–4)
WBC # BLD AUTO: 8.33 K/UL (ref 3.9–12.7)

## 2024-07-05 PROCEDURE — 84439 ASSAY OF FREE THYROXINE: CPT | Performed by: NURSE PRACTITIONER

## 2024-07-05 PROCEDURE — 80061 LIPID PANEL: CPT | Performed by: NURSE PRACTITIONER

## 2024-07-05 PROCEDURE — 82043 UR ALBUMIN QUANTITATIVE: CPT | Performed by: NURSE PRACTITIONER

## 2024-07-05 PROCEDURE — 82570 ASSAY OF URINE CREATININE: CPT | Performed by: NURSE PRACTITIONER

## 2024-07-05 PROCEDURE — 80053 COMPREHEN METABOLIC PANEL: CPT | Performed by: NURSE PRACTITIONER

## 2024-07-05 PROCEDURE — 85025 COMPLETE CBC W/AUTO DIFF WBC: CPT | Performed by: NURSE PRACTITIONER

## 2024-07-05 PROCEDURE — 36415 COLL VENOUS BLD VENIPUNCTURE: CPT | Mod: PO | Performed by: NURSE PRACTITIONER

## 2024-07-05 PROCEDURE — 84443 ASSAY THYROID STIM HORMONE: CPT | Performed by: NURSE PRACTITIONER

## 2024-07-05 PROCEDURE — 83036 HEMOGLOBIN GLYCOSYLATED A1C: CPT | Performed by: NURSE PRACTITIONER

## 2024-07-10 ENCOUNTER — HOSPITAL ENCOUNTER (OUTPATIENT)
Dept: RADIOLOGY | Facility: HOSPITAL | Age: 64
Discharge: HOME | End: 2024-07-10
Attending: NURSE PRACTITIONER
Payer: MEDICARE

## 2024-07-10 DIAGNOSIS — Z12.31 ENCOUNTER FOR SCREENING MAMMOGRAM FOR MALIGNANT NEOPLASM OF BREAST: ICD-10-CM

## 2024-07-10 PROCEDURE — 77067 SCR MAMMO BI INCL CAD: CPT | Mod: TC,PO

## 2024-07-10 PROCEDURE — 77067 SCR MAMMO BI INCL CAD: CPT | Mod: 26,,, | Performed by: RADIOLOGY

## 2024-07-10 PROCEDURE — 77063 BREAST TOMOSYNTHESIS BI: CPT | Mod: 26,,, | Performed by: RADIOLOGY

## 2024-07-15 ENCOUNTER — PATIENT MESSAGE (OUTPATIENT)
Dept: FAMILY MEDICINE | Facility: CLINIC | Age: 64
End: 2024-07-15

## 2024-07-15 ENCOUNTER — OFFICE VISIT (OUTPATIENT)
Dept: FAMILY MEDICINE | Facility: CLINIC | Age: 64
End: 2024-07-15
Payer: MEDICARE

## 2024-07-15 VITALS
HEART RATE: 84 BPM | HEIGHT: 65 IN | SYSTOLIC BLOOD PRESSURE: 128 MMHG | BODY MASS INDEX: 41.85 KG/M2 | WEIGHT: 251.19 LBS | DIASTOLIC BLOOD PRESSURE: 82 MMHG | OXYGEN SATURATION: 98 %

## 2024-07-15 DIAGNOSIS — K64.9 BLEEDING HEMORRHOIDS: ICD-10-CM

## 2024-07-15 DIAGNOSIS — M79.10 MUSCLE PAIN: ICD-10-CM

## 2024-07-15 DIAGNOSIS — E89.0 H/O THYROIDECTOMY: ICD-10-CM

## 2024-07-15 DIAGNOSIS — L91.8 MULTIPLE ACQUIRED SKIN TAGS: ICD-10-CM

## 2024-07-15 DIAGNOSIS — L73.2 HYDRADENITIS: ICD-10-CM

## 2024-07-15 DIAGNOSIS — J31.0 CHRONIC RHINITIS: ICD-10-CM

## 2024-07-15 DIAGNOSIS — R60.0 BILATERAL LOWER EXTREMITY EDEMA: ICD-10-CM

## 2024-07-15 DIAGNOSIS — E11.9 TYPE 2 DIABETES MELLITUS WITHOUT COMPLICATION, WITHOUT LONG-TERM CURRENT USE OF INSULIN: Primary | ICD-10-CM

## 2024-07-15 DIAGNOSIS — E78.5 DYSLIPIDEMIA: ICD-10-CM

## 2024-07-15 DIAGNOSIS — R10.9 ABDOMINAL PAIN, UNSPECIFIED ABDOMINAL LOCATION: ICD-10-CM

## 2024-07-15 DIAGNOSIS — Z91.09 ENVIRONMENTAL ALLERGIES: ICD-10-CM

## 2024-07-15 DIAGNOSIS — Z78.0 POST-MENOPAUSAL: ICD-10-CM

## 2024-07-15 DIAGNOSIS — K59.04 CHRONIC IDIOPATHIC CONSTIPATION: ICD-10-CM

## 2024-07-15 PROCEDURE — 3061F NEG MICROALBUMINURIA REV: CPT | Mod: CPTII,S$GLB,, | Performed by: NURSE PRACTITIONER

## 2024-07-15 PROCEDURE — 3008F BODY MASS INDEX DOCD: CPT | Mod: CPTII,S$GLB,, | Performed by: NURSE PRACTITIONER

## 2024-07-15 PROCEDURE — 3288F FALL RISK ASSESSMENT DOCD: CPT | Mod: CPTII,S$GLB,, | Performed by: NURSE PRACTITIONER

## 2024-07-15 PROCEDURE — 1101F PT FALLS ASSESS-DOCD LE1/YR: CPT | Mod: CPTII,S$GLB,, | Performed by: NURSE PRACTITIONER

## 2024-07-15 PROCEDURE — 3074F SYST BP LT 130 MM HG: CPT | Mod: CPTII,S$GLB,, | Performed by: NURSE PRACTITIONER

## 2024-07-15 PROCEDURE — 3044F HG A1C LEVEL LT 7.0%: CPT | Mod: CPTII,S$GLB,, | Performed by: NURSE PRACTITIONER

## 2024-07-15 PROCEDURE — 3079F DIAST BP 80-89 MM HG: CPT | Mod: CPTII,S$GLB,, | Performed by: NURSE PRACTITIONER

## 2024-07-15 PROCEDURE — 99214 OFFICE O/P EST MOD 30 MIN: CPT | Mod: S$GLB,,, | Performed by: NURSE PRACTITIONER

## 2024-07-15 PROCEDURE — 1159F MED LIST DOCD IN RCRD: CPT | Mod: CPTII,S$GLB,, | Performed by: NURSE PRACTITIONER

## 2024-07-15 PROCEDURE — 3066F NEPHROPATHY DOC TX: CPT | Mod: CPTII,S$GLB,, | Performed by: NURSE PRACTITIONER

## 2024-07-15 PROCEDURE — 99999 PR PBB SHADOW E&M-EST. PATIENT-LVL V: CPT | Mod: PBBFAC,,, | Performed by: NURSE PRACTITIONER

## 2024-07-15 PROCEDURE — 1160F RVW MEDS BY RX/DR IN RCRD: CPT | Mod: CPTII,S$GLB,, | Performed by: NURSE PRACTITIONER

## 2024-07-15 RX ORDER — AZELASTINE 1 MG/ML
1 SPRAY, METERED NASAL 2 TIMES DAILY
Qty: 30 ML | Refills: 5 | Status: SHIPPED | OUTPATIENT
Start: 2024-07-15

## 2024-07-15 RX ORDER — HYDROCHLOROTHIAZIDE 25 MG/1
25 TABLET ORAL DAILY
Qty: 90 TABLET | Refills: 1 | Status: SHIPPED | OUTPATIENT
Start: 2024-07-15

## 2024-07-15 RX ORDER — LORATADINE PSEUDOEPHEDRINE SULFATE 10; 240 MG/1; MG/1
1 TABLET, EXTENDED RELEASE ORAL DAILY
Qty: 90 TABLET | Refills: 0 | Status: SHIPPED | OUTPATIENT
Start: 2024-07-15 | End: 2024-07-15 | Stop reason: SDUPTHER

## 2024-07-15 RX ORDER — OMEPRAZOLE 20 MG/1
20 CAPSULE, DELAYED RELEASE ORAL
COMMUNITY

## 2024-07-15 RX ORDER — LEVOTHYROXINE SODIUM 125 UG/1
125 TABLET ORAL
Qty: 90 TABLET | Refills: 1 | Status: SHIPPED | OUTPATIENT
Start: 2024-07-15

## 2024-07-15 RX ORDER — CYCLOBENZAPRINE HCL 5 MG
5 TABLET ORAL 3 TIMES DAILY PRN
Qty: 30 TABLET | Refills: 5 | Status: SHIPPED | OUTPATIENT
Start: 2024-07-15

## 2024-07-15 RX ORDER — LORATADINE PSEUDOEPHEDRINE SULFATE 10; 240 MG/1; MG/1
1 TABLET, EXTENDED RELEASE ORAL DAILY
Qty: 90 TABLET | Refills: 0 | Status: SHIPPED | OUTPATIENT
Start: 2024-07-15

## 2024-07-15 RX ORDER — CETIRIZINE HYDROCHLORIDE 10 MG/1
10 TABLET ORAL
COMMUNITY
End: 2024-07-15 | Stop reason: ALTCHOICE

## 2024-07-15 RX ORDER — FERROUS SULFATE, DRIED 160(50) MG
TABLET, EXTENDED RELEASE ORAL DAILY
COMMUNITY

## 2024-07-15 RX ORDER — DOXYCYCLINE HYCLATE 100 MG
100 TABLET ORAL DAILY
Qty: 90 TABLET | Refills: 1 | Status: SHIPPED | OUTPATIENT
Start: 2024-07-15

## 2024-07-15 RX ORDER — FLUTICASONE PROPIONATE 50 MCG
SPRAY, SUSPENSION (ML) NASAL
Qty: 16 G | Refills: 5 | Status: SHIPPED | OUTPATIENT
Start: 2024-07-15

## 2024-07-16 NOTE — PROGRESS NOTES
SUBJECTIVE:      Patient ID: Abril Hendrickson is a 64 y.o. female.    Chief Complaint: Follow-up (3 month f/u Lab Review)    Presents for chronic med issues & lab review - total chol 271, trigs 147, HDL 47, . Admits she hasn't been eating well this past month d/t going out of town with her .    Discussed outstanding health maintenance - eye exam scheduled 7/24    Diabetes  She presents for her follow-up diabetic visit. She has type 2 diabetes mellitus. No MedicAlert identification noted. The initial diagnosis of diabetes was made 2 years ago. Her disease course has been fluctuating. Pertinent negatives for hypoglycemia include no confusion, dizziness, headaches, nervousness/anxiousness or pallor. Associated symptoms include fatigue. Pertinent negatives for diabetes include no chest pain, no polydipsia, no polyuria and no weakness. There are no hypoglycemic complications. Symptoms are stable. There are no diabetic complications. Risk factors for coronary artery disease include diabetes mellitus, dyslipidemia, hypertension, obesity, post-menopausal, sedentary lifestyle, family history and stress. Current diabetic treatment includes diet and oral agent (monotherapy) (Mounjaro). She is compliant with treatment most of the time. She is currently taking insulin pre-breakfast. Her weight is fluctuating minimally. She is following a generally unhealthy diet. When asked about meal planning, she reported none. She has not had a previous visit with a dietitian. She rarely participates in exercise. An ACE inhibitor/angiotensin II receptor blocker is not being taken. She does not see a podiatrist.Eye exam is not current.   Hyperlipidemia  This is a chronic problem. The current episode started more than 1 year ago. The problem is uncontrolled. Recent lipid tests were reviewed and are high. Exacerbating diseases include diabetes, hypothyroidism and obesity. Factors aggravating her hyperlipidemia include fatty  foods. Associated symptoms include myalgias. Pertinent negatives include no chest pain or shortness of breath. Current antihyperlipidemic treatment includes diet change and exercise. The current treatment provides mild improvement of lipids. Compliance problems include adherence to diet and adherence to exercise.  Risk factors for coronary artery disease include diabetes mellitus, dyslipidemia, obesity, a sedentary lifestyle, post-menopausal, family history, hypertension and stress.   Constipation  This is a chronic problem. The current episode started more than 1 year ago. The problem has been waxing and waning since onset. Her stool frequency is 2 to 3 times per week. The stool is described as firm, formed and blood coated. The patient is not on a high fiber diet. She Does not exercise regularly. There has Not been adequate water intake. Associated symptoms include bloating and rectal pain. Pertinent negatives include no abdominal pain, back pain, diarrhea, difficulty urinating, nausea or vomiting. Risk factors include stress, obesity and recent illness (thyroidectomy). She has tried stool softeners, laxatives, fiber and diet changes for the symptoms. The treatment provided mild relief.   Sinus Problem  This is a recurrent problem. The current episode started more than 1 year ago. The problem has been waxing and waning since onset. There has been no fever. The pain is mild. Pertinent negatives include no coughing, headaches, neck pain or shortness of breath. Past treatments include nasal decongestants, oral decongestants, saline nose sprays and sitting up (antihistamines).   Thyroid Problem  Presents for follow-up visit. Symptoms include constipation, fatigue and weight gain. Patient reports no anxiety, cold intolerance, diarrhea, heat intolerance, menstrual problem or palpitations. The symptoms have been improving. Her past medical history is significant for diabetes.       Past Surgical History:   Procedure  Laterality Date    CARPAL TUNNEL RELEASE Right 1996     SECTION      x 2,  &     COLONOSCOPY N/A 2021    Procedure: COLONOSCOPY;  Surgeon: Jhoan Mendoza MD;  Location: [facility];  Service: Endoscopy;  Laterality: N/A;    CYST REMOVAL      chest area, left and right underarms    HYSTERECTOMY      OOPHORECTOMY      THYROIDECTOMY Bilateral 2024    Procedure: THYROIDECTOMY;  Surgeon: Javy Petersen III, MD;  Location: [facility];  Service: General;  Laterality: Bilateral;  MONITORING!     Family History   Problem Relation Name Age of Onset    Diabetes Mother      Cancer Mother          unknown  type. Hysterectomy.    Cancer Father          throat cancer. Alcohol abuse, tobacco use.    Alcohol abuse Father      Hyperthyroidism Sister      Ovarian cancer Daughter      Thyroid disease Maternal Aunt      COPD Brother        Social History     Socioeconomic History    Marital status:     Number of children: 2   Occupational History     Employer: [employer]   Tobacco Use    Smoking status: Never    Smokeless tobacco: Never   Substance and Sexual Activity    Alcohol use: No    Drug use: No     Social Determinants of Health     Financial Resource Strain: Medium Risk (2024)    Overall Financial Resource Strain (CARDIA)     Difficulty of Paying Living Expenses: Somewhat hard   Food Insecurity: No Food Insecurity (2024)    Hunger Vital Sign     Worried About Running Out of Food in the Last Year: Never true     Ran Out of Food in the Last Year: Never true   Transportation Needs: No Transportation Needs (2024)    PRAPARE - Transportation     Lack of Transportation (Medical): No     Lack of Transportation (Non-Medical): No   Physical Activity: Insufficiently Active (2024)    Exercise Vital Sign     Days of Exercise per Week: 3 days     Minutes of Exercise per Session: 30 min   Stress: Patient Declined (2024)    [place] [employer] - Occupational  Stress Questionnaire     Feeling of Stress : Patient declined   Housing Stability: Low Risk  (1/12/2024)    Housing Stability Vital Sign     Unable to Pay for Housing in the Last Year: No     Number of Places Lived in the Last Year: 2     Unstable Housing in the Last Year: No     Current Outpatient Medications   Medication Sig Dispense Refill    calcium-vitamin D3 (OS-UMESH 500 + D3) 500 mg-5 mcg (200 unit) per tablet Take by mouth once daily.      DOCOSAHEXAENOIC ACID ORAL Take 2 g by mouth.      HYDROcodone-acetaminophen (NORCO) 5-325 mg per tablet Take 1 tablet by mouth every 6 (six) hours as needed for Pain. 20 tablet 0    hydrocortisone 2.5 % cream Apply topically 2 (two) times daily. 28 g 0    LACTOBACILLUS RHAMNOSUS GG ORAL Take by mouth.      omeprazole (PRILOSEC) 20 MG capsule Take 20 mg by mouth.      phentermine (ADIPEX-P) 37.5 mg tablet Take 1 tablet (37.5 mg total) by mouth once daily. 90 tablet 0    azelastine (ASTELIN) 137 mcg (0.1 %) nasal spray 1 spray (137 mcg total) by Nasal route 2 (two) times daily. 30 mL 5    cyclobenzaprine (FLEXERIL) 5 MG tablet Take 1 tablet (5 mg total) by mouth 3 (three) times daily as needed for Muscle spasms. 30 tablet 5    doxycycline (VIBRA-TABS) 100 MG tablet Take 1 tablet (100 mg total) by mouth once daily. 90 tablet 1    fluticasone propionate (FLONASE) 50 mcg/actuation nasal spray SHAKE LIQUID AND USE 1 SPRAY IN EACH NOSTRIL TWICE DAILY 16 g 5    hydroCHLOROthiazide (HYDRODIURIL) 25 MG tablet Take 1 tablet (25 mg total) by mouth once daily. 90 tablet 1    levothyroxine (SYNTHROID) 125 MCG tablet Take 1 tablet (125 mcg total) by mouth before breakfast. 90 tablet 1    linaCLOtide (LINZESS) 290 mcg Cap capsule Take 1 capsule (290 mcg total) by mouth once daily. 90 capsule 1    loratadine-pseudoephedrine  mg (CLARITIN-D 24 HOUR)  mg per 24 hr tablet Take 1 tablet by mouth once daily. 90 tablet 0    tirzepatide 7.5 mg/0.5 mL PnIj Inject 7.5 mg into the skin  every 7 days. 2 mL 2     No current facility-administered medications for this visit.     Review of patient's allergies indicates:   Allergen Reactions    Bactrim [sulfamethoxazole-trimethoprim] Nausea Only     Bad nausea and stomach cramps    Sulfa (sulfonamide antibiotics) Other (See Comments)     Severe cramping      Past Medical History:   Diagnosis Date    Allergic rhinitis     Bronchitis 2020    Chronic sinusitis     Constipation     Hidradenitis suppurativa     Hyperlipidemia     Hypertension     Meniere disease     Type 2 diabetes mellitus without complication, without long-term current use of insulin 2020    Type 2 diabetes mellitus without retinopathy 2023    EYE EXAM IN MEDIA     Past Surgical History:   Procedure Laterality Date    CARPAL TUNNEL RELEASE Right 1996     SECTION      x 2,  &     COLONOSCOPY N/A 2021    Procedure: COLONOSCOPY;  Surgeon: Jhoan Mendoza MD;  Location: [facility];  Service: Endoscopy;  Laterality: N/A;    CYST REMOVAL      chest area, left and right underarms    HYSTERECTOMY      OOPHORECTOMY      THYROIDECTOMY Bilateral 2024    Procedure: THYROIDECTOMY;  Surgeon: Javy Petersen III, MD;  Location: [facility];  Service: General;  Laterality: Bilateral;  MONITORING!       Review of Systems   Constitutional:  Positive for fatigue and weight gain. Negative for activity change, appetite change and unexpected weight change.   HENT:  Positive for congestion, postnasal drip and sinus pressure. Negative for drooling, ear pain, facial swelling, hearing loss, rhinorrhea, sinus pain, sneezing, sore throat and trouble swallowing.         Recent thyroidectomy with Dr. Petersen   Eyes:  Negative for photophobia, pain, discharge and visual disturbance.   Respiratory:  Negative for cough, chest tightness, shortness of breath and wheezing.    Cardiovascular:  Positive for leg swelling. Negative for chest pain and palpitations.  "  Gastrointestinal:  Positive for anal bleeding (r/t hemorrhoids), bloating, constipation and rectal pain. Negative for abdominal distention, abdominal pain, blood in stool, diarrhea, nausea and vomiting.   Endocrine: Negative for cold intolerance, heat intolerance, polydipsia and polyuria.   Genitourinary:  Negative for difficulty urinating, dysuria, flank pain, frequency, hematuria, menstrual problem, pelvic pain, urgency and vaginal pain.   Musculoskeletal:  Positive for arthralgias and myalgias. Negative for back pain, joint swelling and neck pain.   Skin:  Negative for pallor and rash.        Wants some moles checked for changes   Allergic/Immunologic: Positive for environmental allergies. Negative for food allergies.   Neurological:  Positive for numbness (extremities). Negative for dizziness, weakness, light-headedness and headaches.   Hematological:  Does not bruise/bleed easily.   Psychiatric/Behavioral:  Negative for agitation, confusion, decreased concentration, dysphoric mood and sleep disturbance. The patient is not nervous/anxious.       OBJECTIVE:      Vitals:    07/15/24 1116   BP: 128/82   BP Location: Right arm   Patient Position: Sitting   BP Method: Large (Manual)   Pulse: 84   SpO2: 98%   Weight: 113.9 kg (251 lb 3.2 oz)   Height: 5' 5" (1.651 m)     Physical Exam  Vitals and nursing note reviewed.   Constitutional:       General: She is not in acute distress.     Appearance: Normal appearance. She is well-developed and well-groomed. She is morbidly obese.   HENT:      Head: Normocephalic and atraumatic.      Right Ear: Hearing, ear canal and external ear normal. A middle ear effusion (suppurative) is present.      Left Ear: Hearing, ear canal and external ear normal. A middle ear effusion (serous) is present.      Nose: Mucosal edema present. No rhinorrhea.      Right Sinus: No maxillary sinus tenderness or frontal sinus tenderness.      Left Sinus: No maxillary sinus tenderness or frontal " sinus tenderness.      Mouth/Throat:      Lips: Pink.      Mouth: Mucous membranes are moist. No oral lesions.      Tongue: No lesions.      Palate: No lesions.      Pharynx: Uvula midline. Posterior oropharyngeal erythema present.   Eyes:      General: Lids are normal.         Right eye: No discharge.         Left eye: No discharge.      Conjunctiva/sclera: Conjunctivae normal.      Right eye: Right conjunctiva is not injected.      Left eye: Left conjunctiva is not injected.      Pupils: Pupils are equal, round, and reactive to light. Pupils are equal.      Right eye: Pupil is round and reactive.      Left eye: Pupil is round and reactive.     Neck:      Thyroid: No thyromegaly.      Vascular: No JVD.      Trachea: Trachea normal. No tracheal deviation.     Cardiovascular:      Rate and Rhythm: Normal rate and regular rhythm.      Pulses:           Radial pulses are 2+ on the right side and 2+ on the left side.        Dorsalis pedis pulses are 2+ on the right side and 2+ on the left side.        Posterior tibial pulses are 2+ on the right side and 2+ on the left side.      Heart sounds: Normal heart sounds. No murmur heard.     No friction rub. No gallop.   Pulmonary:      Effort: Pulmonary effort is normal. No respiratory distress.      Breath sounds: Normal breath sounds. No stridor. No decreased breath sounds, wheezing, rhonchi or rales.   Abdominal:      General: Bowel sounds are normal. There is no distension.      Palpations: Abdomen is soft. Abdomen is not rigid.      Tenderness: There is no abdominal tenderness. There is no guarding.   Musculoskeletal:         General: Normal range of motion.      Cervical back: Normal range of motion and neck supple.      Right foot: Normal range of motion.      Left foot: Normal range of motion.   Feet:      Right foot:      Protective Sensation: 8 sites tested.  8 sites sensed.      Skin integrity: Skin integrity normal.      Left foot:      Protective Sensation: 8  sites tested.  8 sites sensed.      Skin integrity: Skin integrity normal.   Lymphadenopathy:      Cervical: No cervical adenopathy.   Skin:     General: Skin is warm and dry.      Capillary Refill: Capillary refill takes less than 2 seconds.      Coloration: Skin is not pale.      Findings: No lesion.      Comments: Several skin tags to neck, SKs to face   Neurological:      Mental Status: She is alert and oriented to person, place, and time.      GCS: GCS eye subscore is 4. GCS verbal subscore is 5. GCS motor subscore is 6.      Cranial Nerves: Cranial nerves 2-12 are intact.      Sensory: Sensation is intact.      Motor: Motor function is intact. No atrophy.      Coordination: Coordination is intact. Coordination normal.      Gait: Gait is intact. Gait normal.   Psychiatric:         Attention and Perception: Attention and perception normal. She is attentive.         Mood and Affect: Mood and affect normal.         Speech: Speech normal.         Behavior: Behavior normal.         Thought Content: Thought content normal.         Cognition and Memory: Cognition and memory normal.         Judgment: Judgment normal.        Assessment:       1. Type 2 diabetes mellitus without complication, without long-term current use of insulin    2. Dyslipidemia    3. Chronic idiopathic constipation    4. Hydradenitis    5. Bilateral lower extremity edema    6. Muscle pain    7. Abdominal pain, unspecified abdominal location    8. Bleeding hemorrhoids    9. Chronic rhinitis    10. Environmental allergies    11. Multiple acquired skin tags    12. Post-menopausal    13. H/O thyroidectomy        Plan:       Type 2 diabetes mellitus without complication, without long-term current use of insulin  -     tirzepatide 7.5 mg/0.5 mL PnIj; Inject 7.5 mg into the skin every 7 days.  Dispense: 2 mL; Refill: 2  -     Ambulatory referral/consult to Gastroenterology; Future; Expected date: 07/22/2024    Dyslipidemia  -     LIPID PANEL; Future;  Expected date: 10/15/2024  - Limit: red meat, butter, fried foods, cheese, and other foods that have a lot of saturated fat. Consume more: lean meats, fish, fruits, vegetables, whole grains, beans, lentils, and nuts. Weight loss, adequate daily hydration and 30-45 min of cardiovascular exercise daily.    Chronic idiopathic constipation  -     linaCLOtide (LINZESS) 290 mcg Cap capsule; Take 1 capsule (290 mcg total) by mouth once daily.  Dispense: 90 capsule; Refill: 1    Hydradenitis  -     doxycycline (VIBRA-TABS) 100 MG tablet; Take 1 tablet (100 mg total) by mouth once daily.  Dispense: 90 tablet; Refill: 1    Bilateral lower extremity edema  -     hydroCHLOROthiazide (HYDRODIURIL) 25 MG tablet; Take 1 tablet (25 mg total) by mouth once daily.  Dispense: 90 tablet; Refill: 1    Muscle pain  -     cyclobenzaprine (FLEXERIL) 5 MG tablet; Take 1 tablet (5 mg total) by mouth 3 (three) times daily as needed for Muscle spasms.  Dispense: 30 tablet; Refill: 5    Abdominal pain, unspecified abdominal location  -     Ambulatory referral/consult to Gastroenterology; Future; Expected date: 07/22/2024    Bleeding hemorrhoids  -     Ambulatory referral/consult to Gastroenterology; Future; Expected date: 07/22/2024    Chronic rhinitis  -     Discontinue: loratadine-pseudoephedrine  mg (CLARITIN-D 24 HOUR)  mg per 24 hr tablet; Take 1 tablet by mouth once daily.  Dispense: 90 tablet; Refill: 0  -     Discontinue: loratadine-pseudoephedrine  mg (CLARITIN-D 24 HOUR)  mg per 24 hr tablet; Take 1 tablet by mouth once daily.  Dispense: 90 tablet; Refill: 0  -     loratadine-pseudoephedrine  mg (CLARITIN-D 24 HOUR)  mg per 24 hr tablet; Take 1 tablet by mouth once daily.  Dispense: 90 tablet; Refill: 0    Environmental allergies  -     azelastine (ASTELIN) 137 mcg (0.1 %) nasal spray; 1 spray (137 mcg total) by Nasal route 2 (two) times daily.  Dispense: 30 mL; Refill: 5  -     fluticasone  propionate (FLONASE) 50 mcg/actuation nasal spray; SHAKE LIQUID AND USE 1 SPRAY IN EACH NOSTRIL TWICE DAILY  Dispense: 16 g; Refill: 5    Multiple acquired skin tags  -     Ambulatory referral/consult to Dermatology; Future; Expected date: 07/22/2024    Post-menopausal  -     DXA Bone Density Axial Skeleton 1 or more sites; Future; Expected date: 07/15/2024    H/O thyroidectomy  -     levothyroxine (SYNTHROID) 125 MCG tablet; Take 1 tablet (125 mcg total) by mouth before breakfast.  Dispense: 90 tablet; Refill: 1                      Follow up in about 6 months (around 1/15/2025) for DM.      7/16/2024 AARON Arreguin, FNP-C

## 2024-07-25 ENCOUNTER — HOSPITAL ENCOUNTER (OUTPATIENT)
Dept: RADIOLOGY | Facility: HOSPITAL | Age: 64
Discharge: HOME | End: 2024-07-25
Attending: NURSE PRACTITIONER
Payer: MEDICARE

## 2024-07-25 DIAGNOSIS — Z78.0 POST-MENOPAUSAL: ICD-10-CM

## 2024-07-25 PROCEDURE — 77080 DXA BONE DENSITY AXIAL: CPT | Mod: TC,PO

## 2024-07-25 PROCEDURE — 77080 DXA BONE DENSITY AXIAL: CPT | Mod: 26,,, | Performed by: RADIOLOGY

## 2024-07-31 LAB
LEFT EYE DM RETINOPATHY: NEGATIVE
RIGHT EYE DM RETINOPATHY: NEGATIVE

## 2024-08-13 ENCOUNTER — OFFICE VISIT (OUTPATIENT)
Dept: ENDOCRINOLOGY | Facility: CLINIC | Age: 64
End: 2024-08-13
Payer: MEDICARE

## 2024-08-13 VITALS
BODY MASS INDEX: 41.63 KG/M2 | SYSTOLIC BLOOD PRESSURE: 120 MMHG | OXYGEN SATURATION: 97 % | HEIGHT: 65 IN | DIASTOLIC BLOOD PRESSURE: 78 MMHG | WEIGHT: 249.88 LBS | HEART RATE: 77 BPM

## 2024-08-13 DIAGNOSIS — R05.3 CHRONIC COUGH: ICD-10-CM

## 2024-08-13 DIAGNOSIS — E89.0 POSTOPERATIVE HYPOTHYROIDISM: Primary | ICD-10-CM

## 2024-08-13 DIAGNOSIS — E11.65 TYPE 2 DIABETES MELLITUS WITH HYPERGLYCEMIA, WITHOUT LONG-TERM CURRENT USE OF INSULIN: ICD-10-CM

## 2024-08-13 DIAGNOSIS — E78.2 MIXED HYPERLIPIDEMIA: ICD-10-CM

## 2024-08-13 DIAGNOSIS — E66.01 MORBID OBESITY WITH BMI OF 40.0-44.9, ADULT: ICD-10-CM

## 2024-08-13 PROCEDURE — 99203 OFFICE O/P NEW LOW 30 MIN: CPT | Mod: S$GLB,,, | Performed by: PHYSICIAN ASSISTANT

## 2024-08-13 PROCEDURE — 1159F MED LIST DOCD IN RCRD: CPT | Mod: CPTII,S$GLB,, | Performed by: PHYSICIAN ASSISTANT

## 2024-08-13 PROCEDURE — 3008F BODY MASS INDEX DOCD: CPT | Mod: CPTII,S$GLB,, | Performed by: PHYSICIAN ASSISTANT

## 2024-08-13 PROCEDURE — 3074F SYST BP LT 130 MM HG: CPT | Mod: CPTII,S$GLB,, | Performed by: PHYSICIAN ASSISTANT

## 2024-08-13 PROCEDURE — 1160F RVW MEDS BY RX/DR IN RCRD: CPT | Mod: CPTII,S$GLB,, | Performed by: PHYSICIAN ASSISTANT

## 2024-08-13 PROCEDURE — 99999 PR PBB SHADOW E&M-EST. PATIENT-LVL IV: CPT | Mod: PBBFAC,,, | Performed by: PHYSICIAN ASSISTANT

## 2024-08-13 PROCEDURE — 1101F PT FALLS ASSESS-DOCD LE1/YR: CPT | Mod: CPTII,S$GLB,, | Performed by: PHYSICIAN ASSISTANT

## 2024-08-13 PROCEDURE — 3061F NEG MICROALBUMINURIA REV: CPT | Mod: CPTII,S$GLB,, | Performed by: PHYSICIAN ASSISTANT

## 2024-08-13 PROCEDURE — 3044F HG A1C LEVEL LT 7.0%: CPT | Mod: CPTII,S$GLB,, | Performed by: PHYSICIAN ASSISTANT

## 2024-08-13 PROCEDURE — 3288F FALL RISK ASSESSMENT DOCD: CPT | Mod: CPTII,S$GLB,, | Performed by: PHYSICIAN ASSISTANT

## 2024-08-13 PROCEDURE — 3066F NEPHROPATHY DOC TX: CPT | Mod: CPTII,S$GLB,, | Performed by: PHYSICIAN ASSISTANT

## 2024-08-13 PROCEDURE — 3078F DIAST BP <80 MM HG: CPT | Mod: CPTII,S$GLB,, | Performed by: PHYSICIAN ASSISTANT

## 2024-08-13 NOTE — PROGRESS NOTES
"CC: Post-surgical Hypothyroidism    HPI: Abril Hendrickson is a 64 y.o. female here for post-surgical hypothyroidism along with pending conditions listed in the Visit Diagnosis. New to endocrine. Diagnosed in 6/24. Pt had a thyroidectomy w/ Dr. Petersen in 6/24 after having compressive sx from a large nodule. Path was benign. Left parathyroid tissue was also removed.  +FHx of thyroid nodules in her aunt, cousin and sister. No hx of neck radiation. No perioral numbness.     On 125 mcg qd.   +wt gain, fatigue, constipation, heat/cold intolerance, sweating, mental fog, tremors, dry skin.   No hair loss, insomnia.      T2DM  A1c was 5.0% in 7/24  On mounjaro.   Eye exam: 7/24 Dr. Merrill Carlson w/ PCP.    Dexa 7/24 wnl.    PMHx, PSHx: reviewed in epic.  Social Hx: no ETOH/tobacco use.     Wt Readings from Last 10 Encounters:   08/13/24 113.3 kg (249 lb 14.3 oz)   07/15/24 113.9 kg (251 lb 3.2 oz)   06/12/24 114.9 kg (253 lb 4.9 oz)   06/11/24 114.2 kg (251 lb 12.3 oz)   06/06/24 112 kg (247 lb)   01/16/24 112 kg (247 lb)   07/13/23 114.8 kg (253 lb)   07/13/23 116.4 kg (256 lb 11.2 oz)   01/12/23 119.7 kg (263 lb 12.8 oz)   06/08/22 117.3 kg (258 lb 9.6 oz)     ROS:   Constitutional: No recent significant weight change  Eyes: No recent visual changes  Cardiovascular: Denies current anginal symptoms  Respiratory: Denies current respiratory difficulty  Gastrointestinal: Denies recent bowel disturbances  GenitoUrinary - No dysuria  Skin: No new skin rash  Neurologic: No focal neurologic complaints  Musculoskeletal: no joint pain  Endocrine: no polyphagia, polydipsia or polyuria  Remainder ROS negative     /78 (BP Method: X-Large (Manual))   Pulse 77   Ht 5' 5" (1.651 m)   Wt 113.3 kg (249 lb 14.3 oz)   SpO2 97%   BMI 41.58 kg/m²      Personally reviewed labs below:    Lab Results   Component Value Date    TSH 0.465 07/05/2024    FREET4 1.09 07/05/2024        Chemistry        Component Value Date/Time     " 07/05/2024 1045     07/05/2024 1045     12/10/2018 1216    K 3.9 07/05/2024 1045    K 3.9 07/05/2024 1045     07/05/2024 1045     07/05/2024 1045     12/10/2018 1216    CO2 23 07/05/2024 1045    CO2 23 07/05/2024 1045    BUN 12 07/05/2024 1045    BUN 12 07/05/2024 1045    CREATININE 0.8 07/05/2024 1045    CREATININE 0.8 07/05/2024 1045    CREATININE 0.71 12/10/2018 1216     07/05/2024 1045     07/05/2024 1045     (H) 12/10/2018 1216        Component Value Date/Time    CALCIUM 9.6 07/05/2024 1045    CALCIUM 9.6 07/05/2024 1045    ALKPHOS 96 07/05/2024 1045    AST 24 07/05/2024 1045    ALT 29 07/05/2024 1045    BILITOT 0.3 07/05/2024 1045    ESTGFRAFRICA >60.0 06/07/2022 0934    EGFRNONAA >60.0 06/07/2022 0934         Lab Results   Component Value Date    HGBA1C 5.0 07/05/2024    HGBA1C 5.6 01/12/2023    HGBA1C 5.1 06/07/2022      PE:  GENERAL: Well developed, well nourished  NECK: Supple neck, normal thyroid. No bruit  LYMPHATIC: No cervical or supraclavicular lymphadenopathy  CARDIOVASCULAR: Normal heart sounds, no pedal edema  RESPIRATORY: Normal effort, clear to auscultation  MUSC: 2+ DTR UE/LE  NEURO: steady gait, CN ll-Xll grossly intact  PSYCH: normal mood and affect  8/24  Foot Exam: no sores or macerations noted.     Protective Sensation (w/ 10 gram monofilament):  Right: Intact  Left: Intact    Visual Inspection:  Normal -  Bilateral and Nails Intact - without Evidence of Foot Deformity- Bilateral    Pedal Pulses:   Right: Present  Left: Present    Posterior Tibialis Pulses:   Right:Present  Left: Present     Vibratory Sensation  Right:Positive  Left:Positive     Assessment/Plan:   1. Postoperative hypothyroidism  TSH    T4, Free      2. Type 2 diabetes mellitus with hyperglycemia, without long-term current use of insulin        3. Mixed hyperlipidemia        4. Morbid obesity with BMI of 40.0-44.9, adult        5. Chronic cough  Ambulatory referral/consult  to Pulmonology        Hypothyroidism  -TFTs wnl. Continue LT4 (125 mcg) dose.    T2DM  A1c is stable  Continue mounjaro  F/u w/ PCP.     HLD  Elevated  Intolerant to statins. Tried zetia.    Obesity  -Body mass index is 41.58 kg/m². Increase exercise to 30 min qd.    Cough  -referral to pulmonary.     FOLLOWUP  Referral to pulmonary  F/u in 6 mths-TFTs

## 2024-08-27 DIAGNOSIS — Z00.00 ENCOUNTER FOR MEDICARE ANNUAL WELLNESS EXAM: ICD-10-CM

## 2024-08-28 LAB
LEFT EYE DM RETINOPATHY: NEGATIVE
RIGHT EYE DM RETINOPATHY: NEGATIVE

## 2024-09-06 ENCOUNTER — PATIENT MESSAGE (OUTPATIENT)
Dept: FAMILY MEDICINE | Facility: CLINIC | Age: 64
End: 2024-09-06
Payer: MEDICARE

## 2024-09-13 ENCOUNTER — PATIENT OUTREACH (OUTPATIENT)
Dept: ADMINISTRATIVE | Facility: HOSPITAL | Age: 64
End: 2024-09-13
Payer: MEDICARE

## 2024-09-13 NOTE — PROGRESS NOTES
Population Health Chart Review & Patient Outreach Details      Additional [facility] Health Notes:               Updates Requested / Reviewed:      Updated Care Coordination Note, Care Everywhere, , Care Team Updated, and Immunizations Reconciliation Completed or Queried: [facility] Topics Overdue:      [facility] Score: 1     Eye Exam    Influenza Vaccine  Pneumonia Vaccine  Shingles/Zoster Vaccine  RSV Vaccine                  Health Maintenance Topic(s) Outreach Outcomes & Actions Taken:    Eye Exam - Outreach Outcomes & Actions Taken  : External Records Requested & Care Team Updated if Applicable

## 2024-09-13 NOTE — LETTER
"       AUTHORIZATION FOR RELEASE OF   CONFIDENTIAL INFORMATION    Dear Mc Momin,    We are seeing Abril Hendrickson, date of birth 1960, in the clinic at [facility]. Leanne Gomez APRN, FNP-C is the patient's PCP. Abril Hendrickson has an outstanding lab/procedure at the time we reviewed her chart. In order to help keep her health information updated, she has authorized us to request the following medical record(s):        ( x )  Eye Exam                                          Please fax records to [facility], [place], Aunjel M., APRN,FNP-C, [phone]     If you have any questions, please contact      Kim Guadarrama  Nurse Clinical Care Coordinator  [facility]  Phone: [phone]  Fax: [phone]    Patient Name: Abril Hendrickson  : 1960  Patient Phone #: [phone]                Abril Ontiveros  MRN: [id number]  : 1960  Age: 63 y.o.  Sex: female          Registration Hospital Authorization         Patient/Guardian Signature:      A. Consent for Examination and Treatment: I hereby authorize the providers and employees of [facility] and [facility] ("[facility]") to provide medical treatment/services which includes, but is not limited to, performing and administering tests and diagnostic procedures that are deemed necessary, including, but not limited to, imaging examinations, blood tests and other laboratory procedures as may be required by the hospital, clinic, or may be ordered by my physician(s) or persons working under the general and/or special instructions of my physician(s).      1. I understand and agree that this consent covers all authorized persons, including but not limited to physicians, residents, nurse practitioners, physicians' assistants, specialists, consultants, student nurses, and independently contracted physicians, who are called upon by the physician in " charge, to carry out the diagnostic procedures and medical or surgical treatment.   2. I hereby authorize [facility] to retain or dispose of any specimens or tissue, should there be such remaining from any test or procedure.   3. I hereby authorize and give consent for [facility] providers and employees to take photographs, images or videotapes of such diagnostic, surgical or treatment procedures of Patient as may be required by [facility] or as may be ordered by a physician. I further acknowledge and agree that [facility] may use cameras or other devices for patient monitoring.   4. I am aware that the practice of medicine is not an exact science, and I acknowledge that no guarantees have been made to me as to the outcome of any tests, procedures or treatment.   5. As part of your [facility] delivery, you will be offered a Covid-19 vaccine. Certain eligibility criteria may be supported under Emergency Use Authorization (EUA). Please let your medical team know if you wish to receive the Covid-19 vaccine during this hospitalization.      B. Authorization for Release of Information: I understand that my insurance company and/or their agents may need information necessary to make determinations about payment/reimbursement. I hereby provide authorization to release to all insurance companies, their successors, assignees, other parties with whom they may have contracted, or others acting on their behalf, that are involved with payment for any hospital and/or clinic charges incurred by the patient, any information that they request and deem necessary for payment/reimbursement, and/or quality review.   I further authorize the release of my health information to physicians or other health care practitioners on staff who are involved in my health care now and in the future, and to other health care providers, entities, or institutions for the purpose of my continued care and  treatment, including referrals.         C. Medicare Patient's Certification and Authorization to Release Information and Payment Request: I certify that the information given by me in applying for payment under Title XVIII of the Social Security Act is correct. I authorize any jacobs of medical or other information about me to release to the Social Security Administration, or its intermediaries or carriers, any information needed for this or a related Medicare claim. I request that payment of authorized benefits be made on my behalf.      Hospital Authorization - Form 1084 - pg. 1 of 3                  D. Assignment of Insurance Benefits: I hereby authorize any and all insurance companies, health plans, defined benefit plans, health insurers or any entity that is or may be responsible for payment of my medical expenses to pay all hospital and medical benefits now due, and to become due and payable to me under any hospital benefits, sick benefits, injury benefits or any other benefit for services rendered to me, including Major Medical Benefits, direct to [facility] and all independently contracted physicians. I assign any and all rights that I may have against any and all insurance companies, health plans, defined benefit plans, health insurers or any entity that is or may be responsible for payment of my medical expenses, including, but not limited to any right to appeal a denial of a claim, any right to bring any action, lawsuit, administrative proceeding, or other cause of action on my behalf. I specifically assign my right to pursue litigation against any and all insurance companies, health plans, defined benefit plans, health insurers or any entity that is or may be responsible for payment of my medical expenses based upon a refusal to pay charges.       E. Valuables: It is understood and agreed that [facility] is not liable for the damage to or loss of any money, jewelry, documents, dentures, eye  glasses, hearing aids, prosthetics, or other property of value.      F. Computer Equipment: I understand and agree that should I choose to use computer equipment owned by [facility] or if I choose to access the Internet via [employer] network, I do so at my own risk. [facility] is not responsible for any damage to my computer equipment or to any damages of any type that might arise from my loss of equipment or data.      G. Acceptance of Financial Responsibility: I agree that in consideration of the services and supplies that have been or will be furnished to the patient, I am hereby obligated to pay all charges made for or on the account of the patient according to the standard rates (in effect at the time the services and supplies are delivered) established by [facility], including its Patient Financial Assistance Policy to the extent it is applicable. I understand that I am responsible for all charges, or portions thereof, not covered by insurance or other sources. Patient refunds will be distributed only after balances at all [facility] facilities are paid.      H. Communication Authorization: I hereby authorize [facility] and its representatives, along with any billing service or  who may work on their behalf, to contact me on my cell phone and/or home phone using pre-recorded messages, artificial voice messages, automatic telephone dialing devices or other computer assisted technology, or by electronic mail, text messaging, or by any other form of electronic communication. This includes, but is not limited to, appointment reminders, yearly physical exam reminders, preventive care reminders, patient campaigns, welcome calls, and calls about account balances on my account or any account on which I am listed as a guarantor. I understand I have the right to opt out of these communications at any time.      I. Relationship Between Facility and Physician: I  understand that some, but not all, providers furnishing services to the patient are not employees or agents of [facility]. The patient is under the care and supervision of his/her attending physician, and it is the responsibility of the facility and its nursing staff to carry out the instructions of such physicians. It is the responsibility of the patient's physician/designee to obtain the patient's informed consent, when required, for medical or surgical treatment, special diagnostic or therapeutic procedures, or hospital services rendered for the patient under the special instructions of the physician/designee.      J. Notice of Privacy Practices: I acknowledge I have received a copy of [facility]'s Notice of Privacy Practices.      K. Facility Directory: I have discussed with the organization my desire to be either included or excluded in the facility directory. I understand that if my choice is to opt-out of being identified in the facility directory that the facility will not provide any information about me such as my condition (e.g., fair, stable, etc.) or my location in the facility (e.g., room number, department).         Hospital Authorization - Form 1084 - pg.2 of 3                          L. Immunizations: [facility] shares immunization information with state sponsored health departments to help you and your doctor keep track of your immunization records. By signing, you consent to have this information shared with the health department in your state:      [place] - LINKS ([place] [employer] State[employer])      RIOS [facility]: As used in this document, [facility] means all [facility] owned and managed facilities, including, but not limited to, all health centers, surgery centers, clinics, urgent care centers, and hospitals.      N. TERM: This authorization is valid for this and subsequent care/treatment I receive at [facility] and will remain  valid unless/ until revoked in writing by me.         [facility] complies with applicable Federal civil rights laws and does not discriminate on the basis of race, color, national origin, age, disability, sex, gender identity or expression.      [facility] cumple con las leyes federales de derechos civiles aplicables y no discrimina por motivos de natasha, color, nacionalidad, edad, discapacidad, sexo, identidad o expresión de género. Alejandra ballesteros [phone]      [facility] tu[facility] th? edelmira?t nhân ernestina?n hi?n hành c?a Liên bang và không phân bi?t ??i x? d?a trên ch?ng t?c, màu da, landon?n g?c qu?c shaun, tu?i tác khuy?t t?t, gi?i tính, nh?n d?ng gi?i t??nh ho?c bi?u hi?n. G?i s? [phone].      Hospital Authorization - Form 1084 - pg. 3 of 3

## 2024-09-16 ENCOUNTER — PATIENT OUTREACH (OUTPATIENT)
Dept: ADMINISTRATIVE | Facility: HOSPITAL | Age: 64
End: 2024-09-16
Payer: MEDICARE

## 2024-09-16 NOTE — PROGRESS NOTES
Population Health Chart Review & Patient Outreach Details      Additional [facility] Health Notes:               Updates Requested / Reviewed:      Updated Care Coordination Note and          Health Maintenance Topics Overdue:      VB Score: 0     Patient is not due for any topics at this time.    Influenza Vaccine  Pneumonia Vaccine  Shingles/Zoster Vaccine  RSV Vaccine                  Health Maintenance Topic(s) Outreach Outcomes & Actions Taken:    Eye Exam - Outreach Outcomes & Actions Taken  : Diabetic Eye External Records Uploaded, Care Team & History Updated if Applicable

## 2024-09-16 NOTE — PROGRESS NOTES
Population Health Chart Review & Patient Outreach Details      Additional [facility] Health Notes:               Updates Requested / Reviewed:      Updated Care Coordination Note, Care Everywhere, Care Team Updated, and Immunizations Reconciliation Completed or Queried: [facility] Topics Overdue:      [facility] Score: 0     Patient is not due for any topics at this time.    Influenza Vaccine  Pneumonia Vaccine  Shingles/Zoster Vaccine  RSV Vaccine                  Health Maintenance Topic(s) Outreach Outcomes & Actions Taken:    Eye Exam - Outreach Outcomes & Actions Taken  : Diabetic Eye External Records Uploaded, Care Team & History Updated if Applicable

## 2024-09-24 ENCOUNTER — PATIENT MESSAGE (OUTPATIENT)
Dept: FAMILY MEDICINE | Facility: CLINIC | Age: 64
End: 2024-09-24
Payer: MEDICARE

## 2024-09-24 DIAGNOSIS — E11.9 TYPE 2 DIABETES MELLITUS WITHOUT COMPLICATION, WITHOUT LONG-TERM CURRENT USE OF INSULIN: Primary | ICD-10-CM

## 2024-10-01 RX ORDER — INSULIN PUMP SYRINGE, 3 ML
EACH MISCELLANEOUS
Qty: 1 EACH | Refills: 0 | Status: SHIPPED | OUTPATIENT
Start: 2024-10-01 | End: 2025-10-01

## 2024-10-02 ENCOUNTER — OFFICE VISIT (OUTPATIENT)
Dept: PULMONOLOGY | Facility: CLINIC | Age: 64
End: 2024-10-02
Payer: MEDICARE

## 2024-10-02 VITALS
OXYGEN SATURATION: 94 % | SYSTOLIC BLOOD PRESSURE: 141 MMHG | BODY MASS INDEX: 41.7 KG/M2 | DIASTOLIC BLOOD PRESSURE: 80 MMHG | WEIGHT: 250.31 LBS | HEART RATE: 71 BPM | HEIGHT: 65 IN

## 2024-10-02 DIAGNOSIS — J32.9 RECURRENT SINUSITIS: ICD-10-CM

## 2024-10-02 DIAGNOSIS — R05.3 CHRONIC COUGH: ICD-10-CM

## 2024-10-02 DIAGNOSIS — J41.1 MUCOPURULENT CHRONIC BRONCHITIS: Primary | ICD-10-CM

## 2024-10-02 PROCEDURE — 3077F SYST BP >= 140 MM HG: CPT | Mod: CPTII,S$GLB,, | Performed by: INTERNAL MEDICINE

## 2024-10-02 PROCEDURE — 3061F NEG MICROALBUMINURIA REV: CPT | Mod: CPTII,S$GLB,, | Performed by: INTERNAL MEDICINE

## 2024-10-02 PROCEDURE — 3079F DIAST BP 80-89 MM HG: CPT | Mod: CPTII,S$GLB,, | Performed by: INTERNAL MEDICINE

## 2024-10-02 PROCEDURE — 99999 PR PBB SHADOW E&M-EST. PATIENT-LVL V: CPT | Mod: PBBFAC,,, | Performed by: INTERNAL MEDICINE

## 2024-10-02 PROCEDURE — 3288F FALL RISK ASSESSMENT DOCD: CPT | Mod: CPTII,S$GLB,, | Performed by: INTERNAL MEDICINE

## 2024-10-02 PROCEDURE — 3044F HG A1C LEVEL LT 7.0%: CPT | Mod: CPTII,S$GLB,, | Performed by: INTERNAL MEDICINE

## 2024-10-02 PROCEDURE — 99204 OFFICE O/P NEW MOD 45 MIN: CPT | Mod: S$GLB,,, | Performed by: INTERNAL MEDICINE

## 2024-10-02 PROCEDURE — 1159F MED LIST DOCD IN RCRD: CPT | Mod: CPTII,S$GLB,, | Performed by: INTERNAL MEDICINE

## 2024-10-02 PROCEDURE — 1101F PT FALLS ASSESS-DOCD LE1/YR: CPT | Mod: CPTII,S$GLB,, | Performed by: INTERNAL MEDICINE

## 2024-10-02 PROCEDURE — 3008F BODY MASS INDEX DOCD: CPT | Mod: CPTII,S$GLB,, | Performed by: INTERNAL MEDICINE

## 2024-10-02 PROCEDURE — 3066F NEPHROPATHY DOC TX: CPT | Mod: CPTII,S$GLB,, | Performed by: INTERNAL MEDICINE

## 2024-10-02 RX ORDER — BUDESONIDE AND FORMOTEROL FUMARATE DIHYDRATE 160; 4.5 UG/1; UG/1
2 AEROSOL RESPIRATORY (INHALATION) EVERY 12 HOURS
Qty: 10.2 G | Refills: 11 | Status: SHIPPED | OUTPATIENT
Start: 2024-10-02 | End: 2025-10-02

## 2024-10-02 RX ORDER — MAGNESIUM 200 MG
TABLET ORAL ONCE
COMMUNITY

## 2024-10-02 RX ORDER — RESPIRATORY SYNCYTIAL VISUS VACCINE RECOMBINANT, ADJUVANTED 120MCG/0.5
0.5 KIT INTRAMUSCULAR ONCE
Qty: 0.5 ML | Refills: 0 | Status: SHIPPED | OUTPATIENT
Start: 2024-10-02 | End: 2024-10-02

## 2024-10-02 NOTE — PROGRESS NOTES
10/7/2024    Abril Hendrickson  New Patient Consult    Chief Complaint   Patient presents with    Cough       HPI:10/02/2024- Pt is a 63 yo female with DM2, HTN, HLD, morbid obseity, goiter s/p thyroidectomy presenting for new evaluation.  She followed up with endocrine postoperatively from goiter surg recently.  She was referred here due to chronic bronchitis, severe mucopurulent cough- fills up trash can with tissues- problem persistent for years. Mucous is usually clear but turns green and yellow with infections. She also notices wheezing usually in the am. Occasional SOB. Wakes nightly w/ cough/mucous.  Mucopurulent cough seemed to start with recurrent sinusitis/bronchitis issues, would regularly go to UC and ENT.  She would regularly get resp infection at least 2x/year which would last a long time- up to 4 mos, required antibiotics and steroids. Recurrent infections got worse when she moved here from [place]- 2013.  She has tried an inhaler in past which would help when she has a respiratory infection.  She takes regular doxycycline for hidradenitis- 2 pills every other day.  As a child she had pneumonia  She does have seasonal allergies to pollen.  FH brother has COPD and smokes. Her grandmother had lung cancer.  Pt never smoked.    The chief complaint problem is New to me    PFSH:  Past Medical History:   Diagnosis Date    Allergic rhinitis     Bronchitis 2020    Chronic sinusitis     Constipation     Hidradenitis suppurativa     Hyperlipidemia     Hypertension     Meniere disease     Type 2 diabetes mellitus without complication, without long-term current use of insulin 2020    Type 2 diabetes mellitus without retinopathy 2023    EYE EXAM IN MEDIA         Past Surgical History:   Procedure Laterality Date    CARPAL TUNNEL RELEASE Right 1996     SECTION      x 2,  &     COLONOSCOPY N/A 2021    Procedure: COLONOSCOPY;  Surgeon: Jhoan Mendoza MD;  Location: [facility];  " Service: Endoscopy;  Laterality: N/A;    CYST REMOVAL      chest area, left and right underarms    HYSTERECTOMY  1983    OOPHORECTOMY      THYROIDECTOMY Bilateral 6/12/2024    Procedure: THYROIDECTOMY;  Surgeon: Javy Petersen III, MD;  Location: [facility] OR;  Service: General;  Laterality: Bilateral;  MONITORING!     Social History     Tobacco Use    Smoking status: Never    Smokeless tobacco: Never   Substance Use Topics    Alcohol use: No    Drug use: No     Family History   Problem Relation Name Age of Onset    Diabetes Mother      Cancer Mother          unknown  type. Hysterectomy.    Cancer Father          throat cancer. Alcohol abuse, tobacco use.    Alcohol abuse Father      Hyperthyroidism Sister      COPD Brother      Cancer Maternal Grandmother      Ovarian cancer Daughter      Thyroid disease Maternal Aunt       Review of patient's allergies indicates:   Allergen Reactions    Bactrim [sulfamethoxazole-trimethoprim] Nausea Only     Bad nausea and stomach cramps    Sulfa (sulfonamide antibiotics) Other (See Comments)     Severe cramping       Performance Status:The patient's activity level is functions out of house.      Review of Systems:  a review of eleven systems covering constitutional, Eye, HEENT, Psych, Respiratory, Cardiac, GI, , Musculoskeletal, Endocrine, Dermatologic was negative except for pertinent findings as listed ABOVE and below:  Headache  Dizziness  Weakness  Shakiness  Reflux- better on prilosec  Snores- better after thyroidectomy     Exam:Comprehensive exam done. BP (!) 141/80 (BP Location: Left arm, Patient Position: Sitting)   Pulse 71   Ht 5' 5" (1.651 m)   Wt 113.6 kg (250 lb 5.3 oz)   SpO2 (!) 94% Comment: on room air at rest  BMI 41.66 kg/m²   Exam included Vitals as listed, and patient's appearance and affect and alertness and mood, oral exam for yeast and hygiene and pharynx lesions and Mallapatti (M) score, neck with inspection for jvd and masses and thyroid " abnormalities and lymph nodes (supraclavicular and infraclavicular nodes and axillary also examined and noted if abn), chest exam included symmetry and effort and fremitus and percussion and auscultation, cardiac exam included rhythm and gallops and murmur and rubs and jvd and edema, abdominal exam for mass and hepatosplenomegaly and tenderness and hernias and bowel sounds, Musculoskeletal exam with muscle tone and posture and mobility/gait and  strength, and skin for rashes and cyanosis and pallor and turgor, extremity for clubbing.  Findings were normal except for pertinent findings listed below:  M4, oropharynx clear  HR regular  Breath sounds clear bilaterally  No edema/clubbing    Radiographs (ct chest and cxr) reviewed: view by direct vision and interpretation as below   CXR 6/11/24- clear lungs    Labs reviewed     Lab Results   Component Value Date    WBC 8.33 07/05/2024    HGB 11.9 (L) 07/05/2024    HCT 38.1 07/05/2024    MCV 96 07/05/2024     07/05/2024       CMP  Sodium   Date Value Ref Range Status   07/05/2024 139 136 - 145 mmol/L Final   07/05/2024 139 136 - 145 mmol/L Final   12/10/2018 138 134 - 144 mmol/L      Potassium   Date Value Ref Range Status   07/05/2024 3.9 3.5 - 5.1 mmol/L Final   07/05/2024 3.9 3.5 - 5.1 mmol/L Final     Chloride   Date Value Ref Range Status   07/05/2024 106 95 - 110 mmol/L Final   07/05/2024 106 95 - 110 mmol/L Final   12/10/2018 102 98 - 110 mmol/L      CO2   Date Value Ref Range Status   07/05/2024 23 23 - 29 mmol/L Final   07/05/2024 23 23 - 29 mmol/L Final     Glucose   Date Value Ref Range Status   07/05/2024 107 70 - 110 mg/dL Final   07/05/2024 107 70 - 110 mg/dL Final   12/10/2018 101 (H) 70 - 99 mg/dL      BUN   Date Value Ref Range Status   07/05/2024 12 8 - 23 mg/dL Final   07/05/2024 12 8 - 23 mg/dL Final     Creatinine   Date Value Ref Range Status   07/05/2024 0.8 0.5 - 1.4 mg/dL Final   07/05/2024 0.8 0.5 - 1.4 mg/dL Final   12/10/2018 0.71  0.60 - 1.40 mg/dL      Calcium   Date Value Ref Range Status   07/05/2024 9.6 8.7 - 10.5 mg/dL Final   07/05/2024 9.6 8.7 - 10.5 mg/dL Final     Total Protein   Date Value Ref Range Status   07/05/2024 7.5 6.0 - 8.4 g/dL Final     Albumin   Date Value Ref Range Status   07/05/2024 3.9 3.5 - 5.2 g/dL Final   12/10/2018 4.7 3.1 - 4.7 g/dL      Total Bilirubin   Date Value Ref Range Status   07/05/2024 0.3 0.1 - 1.0 mg/dL Final     Comment:     For infants and newborns, interpretation of results should be based  on gestational age, weight and in agreement with clinical  observations.    Premature Infant recommended reference ranges:  Up to 24 hours.............<8.0 mg/dL  Up to 48 hours............<12.0 mg/dL  3-5 days..................<15.0 mg/dL  6-29 days.................<15.0 mg/dL       Alkaline Phosphatase   Date Value Ref Range Status   07/05/2024 96 55 - 135 U/L Final     AST   Date Value Ref Range Status   07/05/2024 24 10 - 40 U/L Final     ALT   Date Value Ref Range Status   07/05/2024 29 10 - 44 U/L Final     Anion Gap   Date Value Ref Range Status   07/05/2024 10 8 - 16 mmol/L Final   07/05/2024 10 8 - 16 mmol/L Final     eGFR   Date Value Ref Range Status   07/05/2024 >60.0 >60 mL/min/1.73 m^2 Final   07/05/2024 >60.0 >60 mL/min/1.73 m^2 Final         PFT will be done and results to be reviewed      Plan:  Clinical impression is apparently straight forward and impression with management as below.  Chronic cough establishing care- may have bronchiectasis vs cough variant asthma    Problem List Items Addressed This Visit       Recurrent sinusitis     Other Visit Diagnoses       Mucopurulent chronic bronchitis    -  Primary    Relevant Medications    budesonide-formoterol 160-4.5 mcg (SYMBICORT) 160-4.5 mcg/actuation HFAA    Other Relevant Orders    Complete PFT with bronchodilator    IgM    IGG    IGA    Six Minute Walk Test to qualify for Home Oxygen    Humoral Immune Eval (Pneumo Serotypes) With H. Flu     Chronic cough        Relevant Orders    CT Chest Without Contrast            Follow up in about 3 months (around 1/2/2025).    Discussed with patient above for education the following:      Patient Instructions   Start symbicort inhaler 2 puffs twice daily- rinse mouth after use  CT chest and pulmonary function tests to evaluate the lungs  Walk test to evaluate oxygen levels  Go to the lab for blood tests to evaluate immune system  Continue follow up with ENT  All age appropriate vaccines recommended  Covid, flu, pneumonia (after blood drawn), RSV and shingles vaccines  Consider seeing immunologist

## 2024-10-02 NOTE — PATIENT INSTRUCTIONS
Start symbicort inhaler 2 puffs twice daily- rinse mouth after use  CT chest and pulmonary function tests to evaluate the lungs  Walk test to evaluate oxygen levels  Go to the lab for blood tests to evaluate immune system  Continue follow up with ENT  All age appropriate vaccines recommended  Covid, flu, pneumonia (after blood drawn), RSV and shingles vaccines  Consider seeing immunologist

## 2024-11-04 ENCOUNTER — OFFICE VISIT (OUTPATIENT)
Dept: FAMILY MEDICINE | Facility: CLINIC | Age: 64
End: 2024-11-04
Payer: MEDICARE

## 2024-11-04 VITALS
SYSTOLIC BLOOD PRESSURE: 132 MMHG | HEART RATE: 74 BPM | OXYGEN SATURATION: 98 % | DIASTOLIC BLOOD PRESSURE: 74 MMHG | BODY MASS INDEX: 41.58 KG/M2 | WEIGHT: 249.56 LBS | HEIGHT: 65 IN

## 2024-11-04 DIAGNOSIS — Z00.00 ENCOUNTER FOR MEDICARE ANNUAL WELLNESS EXAM: Primary | ICD-10-CM

## 2024-11-04 DIAGNOSIS — R79.89 LOW VITAMIN D LEVEL: ICD-10-CM

## 2024-11-04 DIAGNOSIS — I10 ESSENTIAL HYPERTENSION: ICD-10-CM

## 2024-11-04 DIAGNOSIS — E11.9 TYPE 2 DIABETES MELLITUS WITHOUT COMPLICATION, WITHOUT LONG-TERM CURRENT USE OF INSULIN: ICD-10-CM

## 2024-11-04 DIAGNOSIS — E04.2 MULTINODULAR GOITER: ICD-10-CM

## 2024-11-04 DIAGNOSIS — Z23 NEED FOR INFLUENZA VACCINATION: ICD-10-CM

## 2024-11-04 DIAGNOSIS — E66.01 MORBID OBESITY WITH BMI OF 40.0-44.9, ADULT: ICD-10-CM

## 2024-11-04 DIAGNOSIS — E78.2 MIXED HYPERLIPIDEMIA: ICD-10-CM

## 2024-11-04 PROCEDURE — 3078F DIAST BP <80 MM HG: CPT | Mod: CPTII,S$GLB,, | Performed by: NURSE PRACTITIONER

## 2024-11-04 PROCEDURE — 3061F NEG MICROALBUMINURIA REV: CPT | Mod: CPTII,S$GLB,, | Performed by: NURSE PRACTITIONER

## 2024-11-04 PROCEDURE — 1101F PT FALLS ASSESS-DOCD LE1/YR: CPT | Mod: CPTII,S$GLB,, | Performed by: NURSE PRACTITIONER

## 2024-11-04 PROCEDURE — 2023F DILAT RTA XM W/O RTNOPTHY: CPT | Mod: CPTII,S$GLB,, | Performed by: NURSE PRACTITIONER

## 2024-11-04 PROCEDURE — 3075F SYST BP GE 130 - 139MM HG: CPT | Mod: CPTII,S$GLB,, | Performed by: NURSE PRACTITIONER

## 2024-11-04 PROCEDURE — 1160F RVW MEDS BY RX/DR IN RCRD: CPT | Mod: CPTII,S$GLB,, | Performed by: NURSE PRACTITIONER

## 2024-11-04 PROCEDURE — G0008 ADMIN INFLUENZA VIRUS VAC: HCPCS | Mod: S$GLB,,, | Performed by: NURSE PRACTITIONER

## 2024-11-04 PROCEDURE — 3288F FALL RISK ASSESSMENT DOCD: CPT | Mod: CPTII,S$GLB,, | Performed by: NURSE PRACTITIONER

## 2024-11-04 PROCEDURE — 99999 PR PBB SHADOW E&M-EST. PATIENT-LVL V: CPT | Mod: PBBFAC,,, | Performed by: NURSE PRACTITIONER

## 2024-11-04 PROCEDURE — 90653 IIV ADJUVANT VACCINE IM: CPT | Mod: S$GLB,,, | Performed by: NURSE PRACTITIONER

## 2024-11-04 PROCEDURE — 3044F HG A1C LEVEL LT 7.0%: CPT | Mod: CPTII,S$GLB,, | Performed by: NURSE PRACTITIONER

## 2024-11-04 PROCEDURE — G0402 INITIAL PREVENTIVE EXAM: HCPCS | Mod: S$GLB,,, | Performed by: NURSE PRACTITIONER

## 2024-11-04 PROCEDURE — 1158F ADVNC CARE PLAN TLK DOCD: CPT | Mod: CPTII,S$GLB,, | Performed by: NURSE PRACTITIONER

## 2024-11-04 PROCEDURE — 1159F MED LIST DOCD IN RCRD: CPT | Mod: CPTII,S$GLB,, | Performed by: NURSE PRACTITIONER

## 2024-11-04 PROCEDURE — 3066F NEPHROPATHY DOC TX: CPT | Mod: CPTII,S$GLB,, | Performed by: NURSE PRACTITIONER

## 2024-11-04 NOTE — PATIENT INSTRUCTIONS
Counseling and Referral of Other Preventative  (Italic type indicates deductible and co-insurance are waived)    Patient Name: Abril Hendrickson  Today's Date: 11/4/2024    Health Maintenance       Date Due Completion Date    Pneumococcal Vaccines (Age 65+) (1 of 2 - PCV) Never done ---    Shingles Vaccine (1 of 2) Never done ---    RSV Vaccine (Age 60+ and Pregnant patients) (1 - Risk 60-74 years 1-dose series) Never done ---    COVID-19 Vaccine (4 - 2024-25 season) 09/01/2024 8/11/2023    Hemoglobin A1c 01/05/2025 7/5/2024    Diabetes Urine Screening 07/05/2025 7/5/2024    Lipid Panel 07/05/2025 7/5/2024    Override on 5/9/2017: Done    Override on 5/9/2017: Done    Mammogram 07/10/2025 7/10/2024    Foot Exam 08/13/2025 8/13/2024    Override on 7/15/2024: Done    Override on 1/12/2023: Done    Eye Exam 08/28/2025 8/28/2024    Colorectal Cancer Screening 03/29/2026 3/29/2021    Override on 5/15/2014: Done    TETANUS VACCINE 06/20/2026 6/20/2016    Override on 4/12/2016: Not Clinically Appropriate (dont remember how long ago)    DEXA Scan 07/25/2027 7/25/2024        No orders of the defined types were placed in this encounter.    The following information is provided to all patients.  This information is to help you find resources for any of the problems found today that may be affecting your health:                  Living healthy guide: www.[facility].[place].gov      Understanding Diabetes: www.diabetes.org      Eating healthy: www.cdc.gov/healthyweight      CDC home safety checklist: www.cdc.gov/steadi/patient.html      Agency on Aging: www.goea.[place].gov      Alcoholics anonymous (AA): www.aa.org      Physical Activity: www.steve.nih.gov/az0wkov      Tobacco use: www.quitwithusla.org

## 2024-11-11 NOTE — PROGRESS NOTES
"  Abril Hendrickson presented for an initial Medicare AWV today. The following components were reviewed and updated:    Medical history  Family History  Social history  Allergies and Current Medications  Health Risk Assessment  Health Maintenance  Care Team    **See Completed Assessments for Annual Wellness visit with in the encounter summary    The following assessments were completed:  Depression Screening  Cognitive function Screening    Timed Get Up Test  Whisper Test      Opioid documentation:      Patient does not have a current opioid prescription.          Vitals:    11/04/24 1009   BP: 132/74   BP Location: Left arm   Patient Position: Sitting   Pulse: 74   SpO2: 98%   Weight: 113.2 kg (249 lb 9 oz)   Height: 5' 5" (1.651 m)     Body mass index is 41.53 kg/m².       Physical Exam  Vitals reviewed.   Constitutional:       General: She is not in acute distress.  HENT:      Head: Normocephalic.   Cardiovascular:      Rate and Rhythm: Normal rate.   Pulmonary:      Effort: Pulmonary effort is normal. No respiratory distress.   Skin:     General: Skin is warm.   Neurological:      General: No focal deficit present.      Mental Status: She is alert.   Psychiatric:         Mood and Affect: Mood normal.         Thought Content: Thought content normal.       History of Present Illness    CHIEF COMPLAINT:  Abril presents today for AWV    THYROID SURGERY:  She underwent total thyroidectomy in June. She reports persistent neck pain and nerve pain in the surgical area, extending to the collarbone. The pain has not improved since the surgery, despite being informed it would take approximately six months to heal. She experiences discomfort when attempting to sleep on her back. She notes that the pain was present yesterday and upon arrival for the current appointment.    DIABETES MANAGEMENT:  She is currently taking Jardiance for type 2 diabetes management. Her last A1C in July was 5.0. She previously used Mounjaro and " reports minimal weight reduction with exercise while on it, but did not achieve her desired weight loss goal.    WEIGHT MANAGEMENT:  She reports actively trying to lose weight. Previously, she had a consistent exercise routine, including using a treadmill, riding a bike, and walking, achieving 5,000 to 8,000 steps per day. She typically consumes two meals per day, with breakfast consisting of two boiled eggs. She uses Splenda as a sugar substitute.     RESPIRATORY ISSUES:  She has a history of chronic bronchitis and sinusitis. She reports persistent mucus production, which has improved since addressing thyroid issues but remains problematic. She experiences occasional shortness of breath, which she speculates may be related to anxiety. She continues to have a persistent cough with expectoration, though less severe than previously.    FAMILY STRESS:  She reports significant family stress related to her daughter's cancer diagnosis. She and her  are providing financial support for their daughter's family, including covering the cost of some medical expenses, food, and other necessities. She expresses experiencing financial strain due to these additional responsibilities, noting difficulty in paying for basic needs for both her own household and her daughter's family.    MEMORY CONCERNS:  She reports experiencing difficulty remembering things and forgetfulness in daily activities. She acknowledges that stress and anxiety may be contributing factors to her memory problems. She describes instances where she finds herself not focused on tasks at hand,   often thinking about other matters such as her children's needs while attempting to complete daily activities.    MEDICATIONS AND SUPPLEMENTS:  She reports taking calcium and vitamin D supplements, as well as a natural supplement called citrus bergamot for cholesterol management. She has increased her multivitamin intake to twice daily as recommended by her  endocrinologist. She denies taking prescription cholesterol medication, mentioning previous attempts with three different types. She expresses a preference for managing her cholesterol through diet, exercise, and supplements rather than prescription medication.    ROS:  General: -fever, -chills, -fatigue, -weight gain, -weight loss  Eyes: -vision changes, -redness, -discharge  ENT: +ear pain, -nasal congestion, -sore throat  Cardiovascular: -chest pain, -palpitations, -lower extremity edema  Respiratory: +cough  Gastrointestinal: -abdominal pain, -nausea, -vomiting, -diarrhea, -constipation, -blood in stool  Genitourinary: -dysuria, -hematuria, -frequency  Musculoskeletal: -joint pain, -muscle pain, +neck pain  Skin: -rash, -lesion  Neurological: -headache, -dizziness, -numbness, -tingling  Psychiatric: +anxiety, -depression, -sleep difficulty, -emotional lability        Assessment & Plan    Assessed overall health status, considering recent thyroid surgery, ongoing neck pain, and multiple chronic conditions  Evaluated memory and cognitive function; determined no immediate need for memory specialist referral  Reviewed recent lab results, including A1C of 5.0 in July  Considered patient's difficulty tolerating cholesterol medications in the past  Noted patient's recent weight loss efforts and current use of Jardiance for diabetes management  Diagnoses and health risks identified today and associated recommendations/orders:  1. Encounter for Medicare annual wellness exam  Reviewed health maintenance and provided recommendations   - Flu vaccine administered during visit.  - Abril to consult with Dr. Allison about eligibility and timing for RSV, COVID, pneumonia (PCV20), and shingles (Shingrix) vaccines.  - Ambulatory Referral/Consult to Enhanced Annual Wellness Visit (eAWV)    2. Type 2 diabetes mellitus without complication, without long-term current use of insulin  - Continued Jardiance for diabetes management.  -  Noted that the patient has type 2 diabetes and was previously taking   Mounjaro for management.  - Evaluated the patient's last A1C in July, which was 5.0, considered good.  - Continued Jardiance instead of Mounjaro due to insurance coverage issues.    3. Morbid obesity with BMI of 40.0-44.9, adult    - Explained importance of adequate calorie intake for sustainable weight loss and maintaining muscle mass.  - Discussed glycemic index of sweeteners and natural alternatives like monk fruit and stevia.  - Provided information on Mediterranean diet as a healthy eating plan.  - Educated on the importance of strength training in addition to cardiovascular exercise for metabolism and overall health.  - Explained how stress can impact physical health and cognitive function.  - Abril to start walking 20-30 minutes regularly, aiming for 4-5 days a week of cardiovascular exercise.  - Recommend incorporating strength training exercises a few days a week.  - Abril to research and consider following Mediterranean diet.  - Abril to look up glycemic index for sweeteners to make informed choices.  - Recommend continuing efforts to manage stress levels.    4. Low vitamin D level  - Continued vitamin D and calcium supplements.  - Noted the patient's history of low vitamin D levels.    5. Mixed hyperlipidemia    - Instructed the patient to coordinate with Dr. Gomez about lipid panel before December appointment.  - Noted recent changes in the patient's diet due to travel and family events, which may have affected cholesterol levels.  - Acknowledged that recent dietary changes may be a factor in cholesterol levels.  - Continued the patient's natural supplement (Citrus bergamot) for cholesterol management.  - Recommend diet and exercise for cholesterol management.  - Noted that lipid panel have been ordered by Dr. Gomez    6. Essential hypertension  Continue to monitor  Followed by Leanne Gomez, APRN,FNP-C   [place].       7. Need for influenza vaccination    - influenza (adjuvanted) (Fluad) 45 mcg/0.5 mL IM vaccine (> or = 66 yo) 0.5 mL    8. Multinodular goiter  Continue to monitor  Followed by Rupal Dubois.      - Instructed the patient to contact Dr. Del Valle (thyroid surgeon) regarding ongoing neck pain and discomfort in the surgical area.  - Noted that the patient had thyroid surgery in June and is still experiencing neck pain and nerve pain in the surgical area.  - Evaluated the patient's report of ongoing pain and discomfort in the surgical area, which is not improving.  - Recommend follow-up with the surgeon who performed the thyroid surgery to address ongoing discomfort.  - Noted the patient's report of chronic neck pain and pain at collarbone following thyroid surgery.    - Instructed the patient to discuss with Dr. Allison (pulmonologist) about timing of labs in relation to upcoming pulmonary function test and CT in January.  - Noted the patient's history of chronic bronchitis and chronic sinusitis.  - Acknowledged that thyroid issues may have contributed to respiratory symptoms.  - Confirmed that the patient is scheduled for pulmonary function test and CT with Dr. Allison.  - Noted the patient's report of occasional coughing.  - Suggested that coughing could be related to anxiety or stress.  - Noted the patient's history of sinus problems and mucus production.  - Evaluated the patient's report of improvement in mucus production.  - Continued the patient's use of Flonase and allergy medication.    FINANCIAL STRESS:  - Noted the patient's report of financial stress due to supporting daughter with cancer and her 4 children.  - Acknowledged the financial stress and its impact on the patient's ability to maintain a healthy diet.           Provided Abril with a 5-10 year written screening schedule and personal prevention plan. Recommendations were developed using the USPSTF age appropriate recommendations. Education,  counseling, and referrals were provided as needed.  After Visit Summary printed and given to patient which includes a list of additional screenings\tests needed.    No follow-ups on file.      Millie Leonard NP

## 2024-12-23 ENCOUNTER — PATIENT MESSAGE (OUTPATIENT)
Dept: FAMILY MEDICINE | Facility: CLINIC | Age: 64
End: 2024-12-23
Payer: MEDICARE

## 2024-12-30 PROBLEM — J41.1 MUCOPURULENT CHRONIC BRONCHITIS: Status: ACTIVE | Noted: 2024-12-30

## 2025-01-08 ENCOUNTER — PATIENT MESSAGE (OUTPATIENT)
Dept: PULMONOLOGY | Facility: CLINIC | Age: 65
End: 2025-01-08
Payer: MEDICARE

## 2025-01-13 ENCOUNTER — LAB VISIT (OUTPATIENT)
Dept: LAB | Facility: HOSPITAL | Age: 65
End: 2025-01-13
Attending: NURSE PRACTITIONER
Payer: MEDICARE

## 2025-01-13 DIAGNOSIS — E78.5 DYSLIPIDEMIA: ICD-10-CM

## 2025-01-13 DIAGNOSIS — J41.1 MUCOPURULENT CHRONIC BRONCHITIS: ICD-10-CM

## 2025-01-13 LAB
CHOLEST SERPL-MCNC: 244 MG/DL (ref 120–199)
CHOLEST/HDLC SERPL: 4.9 {RATIO} (ref 2–5)
HDLC SERPL-MCNC: 50 MG/DL (ref 40–75)
HDLC SERPL: 20.5 % (ref 20–50)
IGA SERPL-MCNC: 174 MG/DL (ref 40–350)
IGG SERPL-MCNC: 1170 MG/DL (ref 650–1600)
IGM SERPL-MCNC: 69 MG/DL (ref 50–300)
LDLC SERPL CALC-MCNC: 170.4 MG/DL (ref 63–159)
NONHDLC SERPL-MCNC: 194 MG/DL
TRIGL SERPL-MCNC: 118 MG/DL (ref 30–150)

## 2025-01-13 PROCEDURE — 82784 ASSAY IGA/IGD/IGG/IGM EACH: CPT | Mod: 59 | Performed by: INTERNAL MEDICINE

## 2025-01-13 PROCEDURE — 80061 LIPID PANEL: CPT | Performed by: NURSE PRACTITIONER

## 2025-01-13 PROCEDURE — 36415 COLL VENOUS BLD VENIPUNCTURE: CPT | Mod: PO | Performed by: NURSE PRACTITIONER

## 2025-01-13 PROCEDURE — 82784 ASSAY IGA/IGD/IGG/IGM EACH: CPT | Performed by: INTERNAL MEDICINE

## 2025-01-14 ENCOUNTER — PATIENT MESSAGE (OUTPATIENT)
Dept: FAMILY MEDICINE | Facility: CLINIC | Age: 65
End: 2025-01-14
Payer: MEDICARE

## 2025-01-15 ENCOUNTER — PATIENT MESSAGE (OUTPATIENT)
Dept: FAMILY MEDICINE | Facility: CLINIC | Age: 65
End: 2025-01-15
Payer: MEDICARE

## 2025-01-15 DIAGNOSIS — E78.5 DYSLIPIDEMIA: Primary | ICD-10-CM

## 2025-01-17 ENCOUNTER — PATIENT MESSAGE (OUTPATIENT)
Dept: PULMONOLOGY | Facility: CLINIC | Age: 65
End: 2025-01-17
Payer: MEDICARE

## 2025-01-21 ENCOUNTER — PATIENT MESSAGE (OUTPATIENT)
Dept: FAMILY MEDICINE | Facility: CLINIC | Age: 65
End: 2025-01-21
Payer: MEDICARE

## 2025-01-29 ENCOUNTER — TELEPHONE (OUTPATIENT)
Dept: PULMONOLOGY | Facility: CLINIC | Age: 65
End: 2025-01-29
Payer: MEDICARE

## 2025-01-29 RX ORDER — BEMPEDOIC ACID 180 MG/1
1 TABLET, FILM COATED ORAL DAILY
Qty: 90 TABLET | Refills: 1 | Status: SHIPPED | OUTPATIENT
Start: 2025-01-29

## 2025-02-05 ENCOUNTER — PATIENT MESSAGE (OUTPATIENT)
Dept: FAMILY MEDICINE | Facility: CLINIC | Age: 65
End: 2025-02-05
Payer: MEDICARE

## 2025-02-05 DIAGNOSIS — E11.9 TYPE 2 DIABETES MELLITUS WITHOUT COMPLICATION, WITHOUT LONG-TERM CURRENT USE OF INSULIN: Primary | ICD-10-CM

## 2025-02-11 ENCOUNTER — LAB VISIT (OUTPATIENT)
Dept: LAB | Facility: HOSPITAL | Age: 65
End: 2025-02-11
Attending: PHYSICIAN ASSISTANT
Payer: MEDICARE

## 2025-02-11 DIAGNOSIS — E89.0 POSTOPERATIVE HYPOTHYROIDISM: ICD-10-CM

## 2025-02-11 LAB
T4 FREE SERPL-MCNC: 1.19 NG/DL (ref 0.71–1.51)
TSH SERPL DL<=0.005 MIU/L-ACNC: 0.05 UIU/ML (ref 0.4–4)

## 2025-02-11 PROCEDURE — 84439 ASSAY OF FREE THYROXINE: CPT | Performed by: PHYSICIAN ASSISTANT

## 2025-02-11 PROCEDURE — 36415 COLL VENOUS BLD VENIPUNCTURE: CPT | Mod: PO | Performed by: PHYSICIAN ASSISTANT

## 2025-02-11 PROCEDURE — 84443 ASSAY THYROID STIM HORMONE: CPT | Performed by: PHYSICIAN ASSISTANT

## 2025-02-17 ENCOUNTER — PATIENT MESSAGE (OUTPATIENT)
Dept: ENDOCRINOLOGY | Facility: CLINIC | Age: 65
End: 2025-02-17
Payer: MEDICARE

## 2025-02-19 ENCOUNTER — PATIENT MESSAGE (OUTPATIENT)
Dept: FAMILY MEDICINE | Facility: CLINIC | Age: 65
End: 2025-02-19
Payer: MEDICARE

## 2025-02-19 ENCOUNTER — OFFICE VISIT (OUTPATIENT)
Dept: ENDOCRINOLOGY | Facility: CLINIC | Age: 65
End: 2025-02-19
Payer: MEDICARE

## 2025-02-19 ENCOUNTER — PATIENT MESSAGE (OUTPATIENT)
Dept: ENDOCRINOLOGY | Facility: CLINIC | Age: 65
End: 2025-02-19

## 2025-02-19 DIAGNOSIS — E66.01 MORBID OBESITY WITH BMI OF 40.0-44.9, ADULT: ICD-10-CM

## 2025-02-19 DIAGNOSIS — E11.65 TYPE 2 DIABETES MELLITUS WITH HYPERGLYCEMIA, WITHOUT LONG-TERM CURRENT USE OF INSULIN: ICD-10-CM

## 2025-02-19 DIAGNOSIS — E11.9 TYPE 2 DIABETES MELLITUS WITHOUT COMPLICATION, WITHOUT LONG-TERM CURRENT USE OF INSULIN: Primary | ICD-10-CM

## 2025-02-19 DIAGNOSIS — E89.0 POSTOPERATIVE HYPOTHYROIDISM: Primary | ICD-10-CM

## 2025-02-19 DIAGNOSIS — E78.2 MIXED HYPERLIPIDEMIA: ICD-10-CM

## 2025-02-19 RX ORDER — ROSUVASTATIN CALCIUM 5 MG/1
5 TABLET, COATED ORAL DAILY
Qty: 90 TABLET | Refills: 3 | Status: SHIPPED | OUTPATIENT
Start: 2025-02-19 | End: 2025-02-19 | Stop reason: SDUPTHER

## 2025-02-19 RX ORDER — ROSUVASTATIN CALCIUM 5 MG/1
5 TABLET, COATED ORAL DAILY
Qty: 90 TABLET | Refills: 3 | Status: CANCELLED | OUTPATIENT
Start: 2025-02-19 | End: 2026-02-19

## 2025-02-19 RX ORDER — LEVOTHYROXINE SODIUM 112 UG/1
112 TABLET ORAL
Qty: 90 TABLET | Refills: 3 | Status: SHIPPED | OUTPATIENT
Start: 2025-02-19 | End: 2025-02-20 | Stop reason: SDUPTHER

## 2025-02-19 RX ORDER — DEXAMETHASONE 1 MG/1
TABLET ORAL
Qty: 1 TABLET | Refills: 0 | Status: SHIPPED | OUTPATIENT
Start: 2025-02-19

## 2025-02-19 RX ORDER — ROSUVASTATIN CALCIUM 5 MG/1
5 TABLET, COATED ORAL DAILY
Qty: 90 TABLET | Refills: 3 | Status: SHIPPED | OUTPATIENT
Start: 2025-02-19 | End: 2026-02-19

## 2025-02-19 NOTE — PROGRESS NOTES
CC: Post-surgical Hypothyroidism    The patient location is: Home  The chief complaint leading to consultation is: Hypothyroidism    Visit type: audiovisual    Face to Face time with patient: 15 min  20 minutes of total time spent on the encounter, which includes face to face time and non-face to face time preparing to see the patient (eg, review of tests), Obtaining and/or reviewing separately obtained history, Documenting clinical information in the electronic or other health record, Independently interpreting results (not separately reported) and communicating results to the patient/family/caregiver, or Care coordination (not separately reported).     Each patient to whom he or she provides medical services by telemedicine is:  (1) informed of the relationship between the physician and patient and the respective role of any other health care provider with respect to management of the patient; and (2) notified that he or she may decline to receive medical services by telemedicine and may withdraw from such care at any time.    HPI: Abril eHndrickson is a 64 y.o. female here for post-surgical hypothyroidism along with pending conditions listed in the Visit Diagnosis. New to endocrine. Diagnosed in 6/24. Pt had a thyroidectomy w/ Dr. Petersen in 6/24 after having compressive sx from a large nodule. Path was benign. Left parathyroid tissue was also removed.  +FHx of thyroid nodules in her aunt, cousin and sister. No hx of neck radiation. No perioral numbness. On biotin but stopped one month ago. Her mom passed away this week.    On 125 mcg qd.   +wt gain, fatigue, constipation, anxiety, heat/cold intolerance, sweating, mental fog, tremors, dry skin.   No hair loss, insomnia.      T2DM  A1c was 5.0% in 7/24  On Jardiance.    Eye exam: 7/24 Dr. Momin  Following w/ PCP.    Reports difficulty losing wt and muscle weakness.     Dexa 7/24 wnl.    PMHx, PSHx: reviewed in epic.  Social Hx: no ETOH/tobacco use.     Wt  Readings from Last 10 Encounters:   12/30/24 113.4 kg (250 lb)   11/04/24 113.2 kg (249 lb 9 oz)   10/02/24 113.6 kg (250 lb 5.3 oz)   08/13/24 113.3 kg (249 lb 14.3 oz)   07/15/24 113.9 kg (251 lb 3.2 oz)   06/12/24 114.9 kg (253 lb 4.9 oz)   06/11/24 114.2 kg (251 lb 12.3 oz)   06/06/24 112 kg (247 lb)   01/16/24 112 kg (247 lb)   07/13/23 114.8 kg (253 lb)     ROS:   Constitutional: No recent significant weight change  Eyes: No recent visual changes  Cardiovascular: Denies current anginal symptoms  Respiratory: Denies current respiratory difficulty  Gastrointestinal: Denies recent bowel disturbances  GenitoUrinary - No dysuria  Skin: No new skin rash  Neurologic: No focal neurologic complaints  Musculoskeletal: no joint pain  Endocrine: no polyphagia, polydipsia or polyuria  Remainder ROS negative     There were no vitals taken for this visit.     Personally reviewed labs below:    Lab Results   Component Value Date    TSH 0.054 (L) 02/11/2025    FREET4 1.19 02/11/2025        Chemistry        Component Value Date/Time     07/05/2024 1045     07/05/2024 1045     12/10/2018 1216    K 3.9 07/05/2024 1045    K 3.9 07/05/2024 1045     07/05/2024 1045     07/05/2024 1045     12/10/2018 1216    CO2 23 07/05/2024 1045    CO2 23 07/05/2024 1045    BUN 12 07/05/2024 1045    BUN 12 07/05/2024 1045    CREATININE 0.8 07/05/2024 1045    CREATININE 0.8 07/05/2024 1045    CREATININE 0.71 12/10/2018 1216     07/05/2024 1045     07/05/2024 1045     (H) 12/10/2018 1216        Component Value Date/Time    CALCIUM 9.6 07/05/2024 1045    CALCIUM 9.6 07/05/2024 1045    ALKPHOS 96 07/05/2024 1045    AST 24 07/05/2024 1045    ALT 29 07/05/2024 1045    BILITOT 0.3 07/05/2024 1045    ESTGFRAFRICA >60.0 06/07/2022 0934    EGFRNONAA >60.0 06/07/2022 0934         Lab Results   Component Value Date    HGBA1C 5.0 07/05/2024    HGBA1C 5.6 01/12/2023    HGBA1C 5.1 06/07/2022       PE:  GENERAL: Well developed, well nourished  NECK: Supple neck, normal thyroid. No bruit  LYMPHATIC: No cervical or supraclavicular lymphadenopathy  CARDIOVASCULAR: Normal heart sounds, no pedal edema  RESPIRATORY: Normal effort, clear to auscultation  MUSC: 2+ DTR UE/LE  NEURO: steady gait, CN ll-Xll grossly intact  PSYCH: normal mood and affect  8/24  Foot Exam: no sores or macerations noted.     Protective Sensation (w/ 10 gram monofilament):  Right: Intact  Left: Intact    Visual Inspection:  Normal -  Bilateral and Nails Intact - without Evidence of Foot Deformity- Bilateral    Pedal Pulses:   Right: Present  Left: Present    Posterior Tibialis Pulses:   Right:Present  Left: Present     Vibratory Sensation  Right:Positive  Left:Positive     Assessment/Plan:   1. Postoperative hypothyroidism  T4, Free    TSH    TSH    T4, Free      2. Type 2 diabetes mellitus with hyperglycemia, without long-term current use of insulin        3. Mixed hyperlipidemia  Lipid Panel    Comprehensive Metabolic Panel      4. Morbid obesity with BMI of 40.0-44.9, adult  ACTH    Cortisol    dexAMETHasone (DECADRON) 1 MG Tab        Hypothyroidism  -TFTs wnl. Continue LT4 (125 mcg) dose.  -stop biotin 7 days prior to labs.    T2DM  A1c is stable  Continue mounjaro  F/u w/ PCP.     HLD  Elevated  Intolerant to statins. Tried zetia and had muscle pain.  Tried Lipitor and simvastatin and had extreme muscle pain.  Symptoms resolved when stopping statin.  Will try crestor.  Can try bempodoic acid again if crestor causes muscle pain.    Lab Results   Component Value Date    LDLCALC 170.4 (H) 01/13/2025      Obesity  -There is no height or weight on file to calculate BMI. Increase exercise to 30 min qd. Obtain DMST.      FOLLOWUP  Acth, cortisol--this week DMST  Tfts, A1c, lp, cmp in 8 weeks  F/u in 6 mths-TFTs

## 2025-02-20 ENCOUNTER — LAB VISIT (OUTPATIENT)
Dept: LAB | Facility: HOSPITAL | Age: 65
End: 2025-02-20
Attending: PHYSICIAN ASSISTANT
Payer: MEDICARE

## 2025-02-20 ENCOUNTER — PATIENT MESSAGE (OUTPATIENT)
Dept: ENDOCRINOLOGY | Facility: CLINIC | Age: 65
End: 2025-02-20
Payer: MEDICARE

## 2025-02-20 DIAGNOSIS — E66.01 MORBID OBESITY WITH BMI OF 40.0-44.9, ADULT: ICD-10-CM

## 2025-02-20 LAB — CORTIS SERPL-MCNC: <1 UG/DL

## 2025-02-20 PROCEDURE — 82533 TOTAL CORTISOL: CPT | Performed by: PHYSICIAN ASSISTANT

## 2025-02-20 PROCEDURE — 36415 COLL VENOUS BLD VENIPUNCTURE: CPT | Mod: PO | Performed by: PHYSICIAN ASSISTANT

## 2025-02-20 PROCEDURE — 82024 ASSAY OF ACTH: CPT | Performed by: PHYSICIAN ASSISTANT

## 2025-02-20 RX ORDER — LEVOTHYROXINE SODIUM 112 UG/1
112 TABLET ORAL
Qty: 90 TABLET | Refills: 3 | Status: SHIPPED | OUTPATIENT
Start: 2025-02-20 | End: 2026-02-20

## 2025-02-21 ENCOUNTER — RESULTS FOLLOW-UP (OUTPATIENT)
Dept: ENDOCRINOLOGY | Facility: CLINIC | Age: 65
End: 2025-02-21

## 2025-02-21 LAB — ACTH PLAS-MCNC: 5 PG/ML (ref 0–46)

## 2025-02-22 RX ORDER — TIRZEPATIDE 5 MG/.5ML
5 INJECTION, SOLUTION SUBCUTANEOUS
Qty: 2 ML | Refills: 2 | Status: SHIPPED | OUTPATIENT
Start: 2025-02-22 | End: 2025-02-27 | Stop reason: DRUGHIGH

## 2025-02-27 RX ORDER — TIRZEPATIDE 2.5 MG/.5ML
2.5 INJECTION, SOLUTION SUBCUTANEOUS
Qty: 2 ML | Refills: 2 | Status: SHIPPED | OUTPATIENT
Start: 2025-02-27

## 2025-03-24 ENCOUNTER — PATIENT MESSAGE (OUTPATIENT)
Dept: FAMILY MEDICINE | Facility: CLINIC | Age: 65
End: 2025-03-24
Payer: MEDICARE

## 2025-03-24 DIAGNOSIS — Z00.00 ENCOUNTER FOR MEDICARE ANNUAL WELLNESS EXAM: ICD-10-CM

## 2025-03-24 DIAGNOSIS — E11.9 TYPE 2 DIABETES MELLITUS WITHOUT COMPLICATION, WITHOUT LONG-TERM CURRENT USE OF INSULIN: Primary | ICD-10-CM

## 2025-04-05 ENCOUNTER — PATIENT MESSAGE (OUTPATIENT)
Dept: FAMILY MEDICINE | Facility: CLINIC | Age: 65
End: 2025-04-05
Payer: MEDICARE

## 2025-04-05 DIAGNOSIS — L73.2 HYDRADENITIS: ICD-10-CM

## 2025-04-05 DIAGNOSIS — K59.04 CHRONIC IDIOPATHIC CONSTIPATION: ICD-10-CM

## 2025-04-05 DIAGNOSIS — M79.10 MUSCLE PAIN: ICD-10-CM

## 2025-04-05 DIAGNOSIS — Z91.09 ENVIRONMENTAL ALLERGIES: ICD-10-CM

## 2025-04-05 DIAGNOSIS — R60.0 BILATERAL LOWER EXTREMITY EDEMA: ICD-10-CM

## 2025-04-07 NOTE — TELEPHONE ENCOUNTER
Mounjaro is pended in separate encounter already, have the nasal sprays and rest of scripts pended to be sent to [facility] now.    Medications pended - Pharmacy changed

## 2025-04-09 RX ORDER — FLUTICASONE PROPIONATE 50 MCG
SPRAY, SUSPENSION (ML) NASAL
Qty: 16 G | Refills: 5 | Status: SHIPPED | OUTPATIENT
Start: 2025-04-09

## 2025-04-09 RX ORDER — HYDROCHLOROTHIAZIDE 25 MG/1
25 TABLET ORAL DAILY
Qty: 90 TABLET | Refills: 1 | Status: SHIPPED | OUTPATIENT
Start: 2025-04-09

## 2025-04-09 RX ORDER — AZELASTINE 1 MG/ML
1 SPRAY, METERED NASAL 2 TIMES DAILY
Qty: 30 ML | Refills: 5 | Status: SHIPPED | OUTPATIENT
Start: 2025-04-09

## 2025-04-09 RX ORDER — DOXYCYCLINE HYCLATE 100 MG
100 TABLET ORAL DAILY
Qty: 90 TABLET | Refills: 1 | Status: SHIPPED | OUTPATIENT
Start: 2025-04-09

## 2025-04-09 RX ORDER — CYCLOBENZAPRINE HCL 5 MG
5 TABLET ORAL 3 TIMES DAILY PRN
Qty: 30 TABLET | Refills: 5 | Status: SHIPPED | OUTPATIENT
Start: 2025-04-09

## 2025-04-11 ENCOUNTER — TELEPHONE (OUTPATIENT)
Dept: FAMILY MEDICINE | Facility: CLINIC | Age: 65
End: 2025-04-11
Payer: MEDICARE

## 2025-04-16 ENCOUNTER — LAB VISIT (OUTPATIENT)
Dept: LAB | Facility: HOSPITAL | Age: 65
End: 2025-04-16
Attending: PHYSICIAN ASSISTANT
Payer: MEDICARE

## 2025-04-16 DIAGNOSIS — E78.2 MIXED HYPERLIPIDEMIA: ICD-10-CM

## 2025-04-16 DIAGNOSIS — E89.0 POSTOPERATIVE HYPOTHYROIDISM: ICD-10-CM

## 2025-04-16 LAB
ALBUMIN SERPL BCP-MCNC: 4 G/DL (ref 3.5–5.2)
ALP SERPL-CCNC: 82 UNIT/L (ref 40–150)
ALT SERPL W/O P-5'-P-CCNC: 26 UNIT/L (ref 10–44)
ANION GAP (OHS): 10 MMOL/L (ref 8–16)
AST SERPL-CCNC: 21 UNIT/L (ref 11–45)
BILIRUB SERPL-MCNC: 0.4 MG/DL (ref 0.1–1)
BUN SERPL-MCNC: 14 MG/DL (ref 8–23)
CALCIUM SERPL-MCNC: 9.7 MG/DL (ref 8.7–10.5)
CHLORIDE SERPL-SCNC: 105 MMOL/L (ref 95–110)
CHOLEST SERPL-MCNC: 182 MG/DL (ref 120–199)
CHOLEST/HDLC SERPL: 4 {RATIO} (ref 2–5)
CO2 SERPL-SCNC: 25 MMOL/L (ref 23–29)
CREAT SERPL-MCNC: 0.8 MG/DL (ref 0.5–1.4)
GFR SERPLBLD CREATININE-BSD FMLA CKD-EPI: >60 ML/MIN/1.73/M2
GLUCOSE SERPL-MCNC: 93 MG/DL (ref 70–110)
HDLC SERPL-MCNC: 46 MG/DL (ref 40–75)
HDLC SERPL: 25.3 % (ref 20–50)
LDLC SERPL CALC-MCNC: 114.6 MG/DL (ref 63–159)
NONHDLC SERPL-MCNC: 136 MG/DL
POTASSIUM SERPL-SCNC: 4.1 MMOL/L (ref 3.5–5.1)
PROT SERPL-MCNC: 7.7 GM/DL (ref 6–8.4)
SODIUM SERPL-SCNC: 140 MMOL/L (ref 136–145)
T4 FREE SERPL-MCNC: 1.28 NG/DL (ref 0.71–1.51)
TRIGL SERPL-MCNC: 107 MG/DL (ref 30–150)
TSH SERPL-ACNC: 0.05 UIU/ML (ref 0.4–4)

## 2025-04-16 PROCEDURE — 84439 ASSAY OF FREE THYROXINE: CPT

## 2025-04-16 PROCEDURE — 84443 ASSAY THYROID STIM HORMONE: CPT

## 2025-04-16 PROCEDURE — 80053 COMPREHEN METABOLIC PANEL: CPT

## 2025-04-16 PROCEDURE — 80061 LIPID PANEL: CPT

## 2025-04-16 PROCEDURE — 36415 COLL VENOUS BLD VENIPUNCTURE: CPT | Mod: PO

## 2025-05-17 DIAGNOSIS — E89.0 POSTOPERATIVE HYPOTHYROIDISM: Primary | ICD-10-CM

## 2025-05-17 RX ORDER — LEVOTHYROXINE SODIUM 100 UG/1
100 TABLET ORAL
Qty: 30 TABLET | Refills: 11 | Status: SHIPPED | OUTPATIENT
Start: 2025-05-17 | End: 2026-05-17

## 2025-06-26 ENCOUNTER — PATIENT MESSAGE (OUTPATIENT)
Dept: FAMILY MEDICINE | Facility: CLINIC | Age: 65
End: 2025-06-26

## 2025-06-27 DIAGNOSIS — K64.9 HEMORRHOIDS, UNSPECIFIED HEMORRHOID TYPE: ICD-10-CM

## 2025-06-30 ENCOUNTER — PATIENT MESSAGE (OUTPATIENT)
Dept: ENDOCRINOLOGY | Facility: CLINIC | Age: 65
End: 2025-06-30
Payer: MEDICARE

## 2025-06-30 NOTE — TELEPHONE ENCOUNTER
Patient last seen: 07/15/24  Scheduled to be seen: 07/21/25  Medication last filled: 03/21/24    Medication pended

## 2025-07-01 ENCOUNTER — PATIENT MESSAGE (OUTPATIENT)
Dept: ENDOCRINOLOGY | Facility: CLINIC | Age: 65
End: 2025-07-01
Payer: MEDICARE

## 2025-07-01 RX ORDER — HYDROCORTISONE 25 MG/G
CREAM TOPICAL 2 TIMES DAILY
Qty: 28 G | Refills: 0 | Status: SHIPPED | OUTPATIENT
Start: 2025-07-01

## 2025-07-02 RX ORDER — LEVOTHYROXINE SODIUM 100 UG/1
100 TABLET ORAL
Qty: 30 TABLET | Refills: 11 | Status: SHIPPED | OUTPATIENT
Start: 2025-07-02 | End: 2025-07-02 | Stop reason: SDUPTHER

## 2025-07-02 RX ORDER — LEVOTHYROXINE SODIUM 100 UG/1
100 TABLET ORAL
Qty: 30 TABLET | Refills: 11 | Status: SHIPPED | OUTPATIENT
Start: 2025-07-02 | End: 2026-07-02

## 2025-07-09 ENCOUNTER — LAB VISIT (OUTPATIENT)
Dept: LAB | Facility: HOSPITAL | Age: 65
End: 2025-07-09
Attending: PHYSICIAN ASSISTANT
Payer: MEDICARE

## 2025-07-09 DIAGNOSIS — E89.0 POSTOPERATIVE HYPOTHYROIDISM: ICD-10-CM

## 2025-07-09 LAB
T4 FREE SERPL-MCNC: 0.98 NG/DL (ref 0.71–1.51)
TSH SERPL-ACNC: 0.32 UIU/ML (ref 0.4–4)

## 2025-07-09 PROCEDURE — 36415 COLL VENOUS BLD VENIPUNCTURE: CPT | Mod: PO

## 2025-07-09 PROCEDURE — 84443 ASSAY THYROID STIM HORMONE: CPT

## 2025-07-09 PROCEDURE — 84439 ASSAY OF FREE THYROXINE: CPT

## 2025-07-17 DIAGNOSIS — E11.9 TYPE 2 DIABETES MELLITUS WITHOUT COMPLICATION: ICD-10-CM

## 2025-07-21 ENCOUNTER — PATIENT MESSAGE (OUTPATIENT)
Dept: FAMILY MEDICINE | Facility: CLINIC | Age: 65
End: 2025-07-21

## 2025-07-21 ENCOUNTER — OFFICE VISIT (OUTPATIENT)
Dept: FAMILY MEDICINE | Facility: CLINIC | Age: 65
End: 2025-07-21
Payer: MEDICARE

## 2025-07-21 DIAGNOSIS — E11.9 TYPE 2 DIABETES MELLITUS WITHOUT COMPLICATION, WITHOUT LONG-TERM CURRENT USE OF INSULIN: Primary | ICD-10-CM

## 2025-07-21 DIAGNOSIS — Z12.31 ENCOUNTER FOR SCREENING MAMMOGRAM FOR MALIGNANT NEOPLASM OF BREAST: ICD-10-CM

## 2025-07-21 DIAGNOSIS — L73.2 HYDRADENITIS: ICD-10-CM

## 2025-07-21 DIAGNOSIS — R60.0 BILATERAL LOWER EXTREMITY EDEMA: ICD-10-CM

## 2025-07-21 DIAGNOSIS — K59.04 CHRONIC IDIOPATHIC CONSTIPATION: ICD-10-CM

## 2025-07-21 DIAGNOSIS — E04.2 MULTINODULAR GOITER: ICD-10-CM

## 2025-07-21 PROCEDURE — 1159F MED LIST DOCD IN RCRD: CPT | Mod: CPTII,95,, | Performed by: NURSE PRACTITIONER

## 2025-07-21 PROCEDURE — 1160F RVW MEDS BY RX/DR IN RCRD: CPT | Mod: CPTII,95,, | Performed by: NURSE PRACTITIONER

## 2025-07-21 PROCEDURE — 98007 SYNCH AUDIO-VIDEO EST HI 40: CPT | Mod: 95,,, | Performed by: NURSE PRACTITIONER

## 2025-07-21 RX ORDER — DOXYCYCLINE HYCLATE 100 MG
100 TABLET ORAL DAILY
Qty: 90 TABLET | Refills: 0 | Status: SHIPPED | OUTPATIENT
Start: 2025-07-21

## 2025-07-21 RX ORDER — HYDROCHLOROTHIAZIDE 25 MG/1
25 TABLET ORAL DAILY
Qty: 90 TABLET | Refills: 0 | Status: SHIPPED | OUTPATIENT
Start: 2025-07-21

## 2025-07-21 NOTE — PROGRESS NOTES
Subjective:        The chief complaint leading to consultation is: DM  The patient location is:  Home  Visit type: Virtual visit with synchronous audio/video or audio only  This was a video visit in lieu of in-person visit due to the coronavirus emergency. Patient acknowledged and consented to the video visit encounter.     Presents via telemed for DM    Constipation  This is a chronic problem. The current episode started more than 1 year ago. The problem has been waxing and waning since onset. Her stool frequency is 2 to 3 times per week. The stool is described as firm, formed and blood coated. The patient is not on a high fiber diet. She Does not exercise regularly. There has Not been adequate water intake. Associated symptoms include bloating and rectal pain. Pertinent negatives include no abdominal pain, back pain, diarrhea, difficulty urinating, nausea or vomiting. Risk factors include stress, obesity and recent illness (thyroidectomy). She has tried stool softeners, laxatives, fiber and diet changes for the symptoms. The treatment provided mild relief.     Thyroid Problem  Presents for follow-up visit. Symptoms include constipation, fatigue. Patient reports no anxiety, cold intolerance, diarrhea, heat intolerance, menstrual problem or palpitations. The symptoms have been improving. Her past medical history is significant for diabetes.     Diabetes  She presents for her follow-up diabetic visit. She has type 2 diabetes mellitus. No MedicAlert identification noted. Her disease course has been fluctuating. Hypoglycemia symptoms include confusion, headaches and sleepiness. Pertinent negatives for hypoglycemia include no dizziness, nervousness/anxiousness or pallor. Associated symptoms include fatigue. Pertinent negatives for diabetes include no chest pain, no polydipsia, no polyuria and no weakness. Symptoms are stable. Risk factors for coronary artery disease include diabetes mellitus, obesity, sedentary  lifestyle, post-menopausal, dyslipidemia and hypertension. Current diabetic treatments: GLP-1. She is compliant with treatment all of the time. She never participates in exercise. An ACE inhibitor/angiotensin II receptor blocker is not being taken. She does not see a podiatrist.Eye exam is not current.       Past Surgical History:   Procedure Laterality Date    CARPAL TUNNEL RELEASE Right 1996     SECTION      x 2,  &     COLONOSCOPY N/A 2021    Procedure: COLONOSCOPY;  Surgeon: Jhoan Mendoza MD;  Location: [facility];  Service: Endoscopy;  Laterality: N/A;    CYST REMOVAL      chest area, left and right underarms    HYSTERECTOMY      OOPHORECTOMY      THYROIDECTOMY Bilateral 2024    Procedure: THYROIDECTOMY;  Surgeon: Javy Petersen III, MD;  Location: [facility];  Service: General;  Laterality: Bilateral;  MONITORING!     Past Medical History:   Diagnosis Date    Allergic rhinitis     Bronchitis 2020    Chronic sinusitis     Constipation     Hidradenitis suppurativa     Hyperlipidemia     Hypertension     Meniere disease     Type 2 diabetes mellitus without complication, without long-term current use of insulin 2020    Type 2 diabetes mellitus without retinopathy 2023    EYE EXAM IN MEDIA     Family History   Problem Relation Name Age of Onset    Diabetes Mother      Cancer Mother          unknown  type. Hysterectomy.    Cancer Father          throat cancer. Alcohol abuse, tobacco use.    Alcohol abuse Father      Hyperthyroidism Sister      COPD Brother      Cancer Maternal Grandmother      Ovarian cancer Daughter      Thyroid disease Maternal Aunt          Social History:   Marital Status:   Alcohol History:  reports no history of alcohol use.  Tobacco History:  reports that she has never smoked. She has never used smokeless tobacco.  Drug History:  reports no history of drug use.    Review of patient's allergies indicates:   Allergen Reactions     Bactrim [sulfamethoxazole-trimethoprim] Nausea Only     Bad nausea and stomach cramps    Sulfa (sulfonamide antibiotics) Other (See Comments)     Severe cramping       Current Outpatient Medications   Medication Sig Dispense Refill    albuterol-budesonide (AIRSUPRA) 90-80 mcg/actuation Inhale 2 puffs into the lungs every 4 (four) hours as needed (wheezing, shortness of breath). 10.7 g 11    azelastine (ASTELIN) 137 mcg (0.1 %) nasal spray 1 spray (137 mcg total) by Nasal route 2 (two) times daily. 30 mL 5    blood sugar diagnostic Strp 1 each by Misc.(Non-Drug; Combo Route) route once daily. 100 each 1    blood-glucose meter kit Use as instructed 1 each 0    calcium-vitamin D3 (OS-UMESH 500 + D3) 500 mg-5 mcg (200 unit) per tablet Take by mouth once daily.      cyclobenzaprine (FLEXERIL) 5 MG tablet Take 1 tablet (5 mg total) by mouth 3 (three) times daily as needed for Muscle spasms. 30 tablet 5    doxycycline (VIBRA-TABS) 100 MG tablet Take 1 tablet (100 mg total) by mouth once daily. 90 tablet 0    estradioL (VAGIFEM) 10 mcg Tab Initial dosin tablet (10 mcg) inserted vaginally once daily for 2 weeks THEN 1 tablet (10 mcg) inserted vaginally twice weekly 20 tablet 0    fluticasone propionate (FLONASE) 50 mcg/actuation nasal spray SHAKE LIQUID AND USE 1 SPRAY IN EACH NOSTRIL TWICE DAILY 16 g 5    hydroCHLOROthiazide (HYDRODIURIL) 25 MG tablet Take 1 tablet (25 mg total) by mouth once daily. 90 tablet 0    hydrocortisone 2.5 % cream Apply topically 2 (two) times daily. 28 g 0    LACTOBACILLUS RHAMNOSUS GG ORAL Take by mouth.      lancets (ONETOUCH ULTRASOFT LANCETS) Misc 1 each by Misc.(Non-Drug; Combo Route) route once daily. 100 each 1    linaCLOtide (LINZESS) 290 mcg Cap capsule Take 1 capsule (290 mcg total) by mouth once daily. 90 capsule 0    magnesium 200 mg Tab Take by mouth once.      montelukast (SINGULAIR) 10 mg tablet Take 1 tablet (10 mg total) by mouth every evening. 30 tablet 11    omeprazole  (PRILOSEC) 20 MG capsule Take 20 mg by mouth.      tirzepatide 10 mg/0.5 mL PnIj Inject 10 mg into the skin every 7 days. 2 mL 2    UNITHROID 100 mcg tablet Take 1 tablet (100 mcg total) by mouth before breakfast. 30 tablet 11     No current facility-administered medications for this visit.       Review of Systems   Constitutional:  Positive for fatigue. Negative for activity change, appetite change, chills and unexpected weight change.   HENT:  Negative for congestion, ear pain, hearing loss, postnasal drip, rhinorrhea, sinus pressure, sinus pain, sneezing, sore throat, trouble swallowing and voice change.    Eyes:  Negative for photophobia, pain, discharge and visual disturbance.   Respiratory:  Negative for cough, chest tightness, shortness of breath and wheezing.    Cardiovascular:  Negative for chest pain, palpitations and leg swelling.   Gastrointestinal:  Positive for constipation. Negative for abdominal distention, abdominal pain, blood in stool, diarrhea, nausea and vomiting.   Endocrine: Positive for heat intolerance. Negative for cold intolerance, polydipsia and polyuria.   Genitourinary:  Negative for difficulty urinating, dysuria, flank pain, frequency, hematuria, menstrual problem, pelvic pain and urgency.   Musculoskeletal:  Positive for arthralgias, myalgias and neck pain. Negative for back pain and joint swelling.   Skin:  Negative for pallor.        HS   Allergic/Immunologic: Positive for environmental allergies. Negative for food allergies.   Neurological:  Positive for headaches. Negative for dizziness, weakness, light-headedness and numbness.        Following with neuro for BLE & BUE neuropathy   Hematological:  Does not bruise/bleed easily.   Psychiatric/Behavioral:  Positive for confusion. Negative for agitation, decreased concentration, dysphoric mood and sleep disturbance. The patient is not nervous/anxious.          Objective:        Physical Exam:   Physical Exam  Constitutional:        General: She is not in acute distress.     Appearance: Normal appearance. She is well-developed. She is obese.   HENT:      Head: Normocephalic and atraumatic.      Nose: Nose normal.   Eyes:      General: Lids are normal.   Neck:      Trachea: No tracheal deviation.   Pulmonary:      Effort: Pulmonary effort is normal. No respiratory distress.   Musculoskeletal:      Cervical back: Normal range of motion.   Skin:     Coloration: Skin is not pale.   Neurological:      Mental Status: She is alert and oriented to person, place, and time.      GCS: GCS eye subscore is 4. GCS verbal subscore is 5. GCS motor subscore is 6.   Psychiatric:         Attention and Perception: Attention normal.         Mood and Affect: Mood normal.         Speech: Speech normal.         Behavior: Behavior normal.         Thought Content: Thought content normal.         Cognition and Memory: Cognition normal.              Assessment:       1. Type 2 diabetes mellitus without complication, without long-term current use of insulin    2. Multinodular goiter    3. Chronic idiopathic constipation    4. Hydradenitis    5. Bilateral lower extremity edema    6. Encounter for screening mammogram for malignant neoplasm of breast      Plan:   Type 2 diabetes mellitus without complication, without long-term current use of insulin  -     tirzepatide 10 mg/0.5 mL PnIj; Inject 10 mg into the skin every 7 days.  Dispense: 2 mL; Refill: 2  -     CBC Auto Differential; Future; Expected date: 07/28/2025  -     Comprehensive Metabolic Panel; Future; Expected date: 07/21/2025  -     TSH; Future; Expected date: 07/21/2025  -     Hemoglobin A1C; Future; Expected date: 07/21/2025  -     Microalbumin/Creatinine Ratio, Urine; Future; Expected date: 07/21/2025    Multinodular goiter  -     US Soft Tissue Head Neck; Future; Expected date: 07/21/2025  -     TSH; Future; Expected date: 07/21/2025    Chronic idiopathic constipation  -     linaCLOtide (LINZESS) 290 mcg Cap  capsule; Take 1 capsule (290 mcg total) by mouth once daily.  Dispense: 90 capsule; Refill: 0    Hydradenitis  -     doxycycline (VIBRA-TABS) 100 MG tablet; Take 1 tablet (100 mg total) by mouth once daily.  Dispense: 90 tablet; Refill: 0    Bilateral lower extremity edema  -     hydroCHLOROthiazide (HYDRODIURIL) 25 MG tablet; Take 1 tablet (25 mg total) by mouth once daily.  Dispense: 90 tablet; Refill: 0    Encounter for screening mammogram for malignant neoplasm of breast  -     Mammo Digital Screening Bilat w/ Alvarado (XPD); Future; Expected date: 07/21/2025          Follow up in about 3 months (around 10/21/2025) for DM.    Total time spent with patient: 28 mins    Each patient to whom he or she provides medical services by telemedicine is:  (1) informed of the relationship between the physician and patient and the respective role of any other health care provider with respect to management of the patient; and (2) notified that he or she may decline to receive medical services by telemedicine and may withdraw from such care at any time.

## 2025-07-22 ENCOUNTER — PATIENT MESSAGE (OUTPATIENT)
Dept: FAMILY MEDICINE | Facility: CLINIC | Age: 65
End: 2025-07-22
Payer: MEDICARE

## 2025-07-22 DIAGNOSIS — N95.1 VASOMOTOR SYMPTOMS DUE TO MENOPAUSE: Primary | ICD-10-CM

## 2025-07-23 ENCOUNTER — PATIENT MESSAGE (OUTPATIENT)
Dept: FAMILY MEDICINE | Facility: CLINIC | Age: 65
End: 2025-07-23
Payer: MEDICARE

## 2025-07-23 ENCOUNTER — OFFICE VISIT (OUTPATIENT)
Dept: PULMONOLOGY | Facility: CLINIC | Age: 65
End: 2025-07-23
Payer: MEDICARE

## 2025-07-23 VITALS
HEIGHT: 65 IN | OXYGEN SATURATION: 98 % | SYSTOLIC BLOOD PRESSURE: 146 MMHG | HEART RATE: 71 BPM | BODY MASS INDEX: 41.65 KG/M2 | DIASTOLIC BLOOD PRESSURE: 72 MMHG | WEIGHT: 250 LBS

## 2025-07-23 DIAGNOSIS — J45.30 MILD PERSISTENT ASTHMA WITHOUT COMPLICATION: ICD-10-CM

## 2025-07-23 DIAGNOSIS — J47.9 BRONCHIECTASIS WITHOUT COMPLICATION: Primary | ICD-10-CM

## 2025-07-23 DIAGNOSIS — J41.1 MUCOPURULENT CHRONIC BRONCHITIS: ICD-10-CM

## 2025-07-23 DIAGNOSIS — J32.9 RECURRENT SINUSITIS: ICD-10-CM

## 2025-07-23 DIAGNOSIS — E66.01 MORBID OBESITY WITH BMI OF 40.0-44.9, ADULT: ICD-10-CM

## 2025-07-23 DIAGNOSIS — E11.9 TYPE 2 DIABETES MELLITUS WITHOUT COMPLICATION, WITHOUT LONG-TERM CURRENT USE OF INSULIN: ICD-10-CM

## 2025-07-23 PROCEDURE — 1101F PT FALLS ASSESS-DOCD LE1/YR: CPT | Mod: CPTII,S$GLB,, | Performed by: INTERNAL MEDICINE

## 2025-07-23 PROCEDURE — 99214 OFFICE O/P EST MOD 30 MIN: CPT | Mod: S$GLB,,, | Performed by: INTERNAL MEDICINE

## 2025-07-23 PROCEDURE — 3078F DIAST BP <80 MM HG: CPT | Mod: CPTII,S$GLB,, | Performed by: INTERNAL MEDICINE

## 2025-07-23 PROCEDURE — 1126F AMNT PAIN NOTED NONE PRSNT: CPT | Mod: CPTII,S$GLB,, | Performed by: INTERNAL MEDICINE

## 2025-07-23 PROCEDURE — 1159F MED LIST DOCD IN RCRD: CPT | Mod: CPTII,S$GLB,, | Performed by: INTERNAL MEDICINE

## 2025-07-23 PROCEDURE — 3288F FALL RISK ASSESSMENT DOCD: CPT | Mod: CPTII,S$GLB,, | Performed by: INTERNAL MEDICINE

## 2025-07-23 PROCEDURE — 99999 PR PBB SHADOW E&M-EST. PATIENT-LVL III: CPT | Mod: PBBFAC,,, | Performed by: INTERNAL MEDICINE

## 2025-07-23 PROCEDURE — 3008F BODY MASS INDEX DOCD: CPT | Mod: CPTII,S$GLB,, | Performed by: INTERNAL MEDICINE

## 2025-07-23 PROCEDURE — 3077F SYST BP >= 140 MM HG: CPT | Mod: CPTII,S$GLB,, | Performed by: INTERNAL MEDICINE

## 2025-07-23 RX ORDER — MONTELUKAST SODIUM 10 MG/1
10 TABLET ORAL NIGHTLY
Qty: 30 TABLET | Refills: 11 | Status: SHIPPED | OUTPATIENT
Start: 2025-07-23 | End: 2026-07-18

## 2025-07-23 NOTE — PROGRESS NOTES
7/23/2025    Abril Hendrickson  Follow up    Chief Complaint   Patient presents with    Cough       HPI:  07/23/2025- she tried inhaler but had headache and couldn't tolerate it. She was doing better w/ cough but now coughing more at night, productive and clear.  Occasional wheezing, borjas. Sometimes she will get sharp chest pain on R, pleuritic, lasts few min.      10/02/2024-   Start symbicort inhaler 2 puffs twice daily- rinse mouth after use  CT chest and pulmonary function tests to evaluate the lungs  Walk test to evaluate oxygen levels  Go to the lab for blood tests to evaluate immune system  Continue follow up with ENT  All age appropriate vaccines recommended  Covid, flu, pneumonia (after blood drawn), RSV and shingles vaccines  Consider seeing immunologist    Pt is a 65 yo female with DM2, HTN, HLD, morbid obseity, goiter s/p thyroidectomy presenting for new evaluation.  She followed up with endocrine postoperatively from goiter surg recently.  She was referred here due to chronic bronchitis, severe mucopurulent cough- fills up trash can with tissues- problem persistent for years. Mucous is usually clear but turns green and yellow with infections. She also notices wheezing usually in the am. Occasional SOB. Wakes nightly w/ cough/mucous.  Mucopurulent cough seemed to start with recurrent sinusitis/bronchitis issues, would regularly go to UC and ENT.  She would regularly get resp infection at least 2x/year which would last a long time- up to 4 mos, required antibiotics and steroids. Recurrent infections got worse when she moved here from [place]- 2013.  She has tried an inhaler in past which would help when she has a respiratory infection.  She takes regular doxycycline for hidradenitis- 2 pills every other day.  As a child she had pneumonia  She does have seasonal allergies to pollen.  FH brother has COPD and smokes. Her grandmother had lung cancer.  Pt never smoked.    The chief complaint problem is  stable    PFSH:  Past Medical History:   Diagnosis Date    Allergic rhinitis     Bronchitis 2020    Chronic sinusitis     Constipation     Hidradenitis suppurativa     Hyperlipidemia     Hypertension     Meniere disease     Type 2 diabetes mellitus without complication, without long-term current use of insulin 2020    Type 2 diabetes mellitus without retinopathy 2023    EYE EXAM IN MEDIA         Past Surgical History:   Procedure Laterality Date    CARPAL TUNNEL RELEASE Right 1996     SECTION      x 2,  &     COLONOSCOPY N/A 2021    Procedure: COLONOSCOPY;  Surgeon: Jhoan Mendoza MD;  Location: [facility];  Service: Endoscopy;  Laterality: N/A;    CYST REMOVAL      chest area, left and right underarms    HYSTERECTOMY      OOPHORECTOMY      THYROIDECTOMY Bilateral 2024    Procedure: THYROIDECTOMY;  Surgeon: Javy Petersen III, MD;  Location: [facility] OR;  Service: General;  Laterality: Bilateral;  MONITORING!     Social History     Tobacco Use    Smoking status: Never    Smokeless tobacco: Never   Substance Use Topics    Alcohol use: No    Drug use: No     Family History   Problem Relation Name Age of Onset    Diabetes Mother      Cancer Mother          unknown  type. Hysterectomy.    Cancer Father          throat cancer. Alcohol abuse, tobacco use.    Alcohol abuse Father      Hyperthyroidism Sister      COPD Brother      Cancer Maternal Grandmother      Ovarian cancer Daughter      Thyroid disease Maternal Aunt       Review of patient's allergies indicates:   Allergen Reactions    Bactrim [sulfamethoxazole-trimethoprim] Nausea Only     Bad nausea and stomach cramps    Sulfa (sulfonamide antibiotics) Other (See Comments)     Severe cramping       Performance Status:The patient's activity level is functions out of house.      Review of Systems:  a review of eleven systems covering constitutional, Eye, HEENT, Psych, Respiratory, Cardiac, GI, ,  "Musculoskeletal, Endocrine, Dermatologic was negative except for pertinent findings as listed ABOVE and below:  All negative with pertinent positives as above       Exam:Comprehensive exam done. BP (!) 146/72 (BP Location: Left arm, Patient Position: Sitting)   Pulse 71   Ht 5' 5" (1.651 m)   Wt 113.4 kg (250 lb)   SpO2 98%   BMI 41.60 kg/m²   Exam included Vitals as listed, and patient's appearance and affect and alertness and mood, oral exam for yeast and hygiene and pharynx lesions and Mallapatti (M) score, neck with inspection for jvd and masses and thyroid abnormalities and lymph nodes (supraclavicular and infraclavicular nodes and axillary also examined and noted if abn), chest exam included symmetry and effort and fremitus and percussion and auscultation, cardiac exam included rhythm and gallops and murmur and rubs and jvd and edema, abdominal exam for mass and hepatosplenomegaly and tenderness and hernias and bowel sounds, Musculoskeletal exam with muscle tone and posture and mobility/gait and  strength, and skin for rashes and cyanosis and pallor and turgor, extremity for clubbing.  Findings were normal except for pertinent findings listed below:    HR regular  Breath sounds clear bilaterally  No edema/clubbing    Radiographs (ct chest and cxr) reviewed: view by direct vision and interpretation as below   CT chest 12/30/24- mild bibasilar bronchiectasis  CXR 6/11/24- clear lungs    Labs reviewed     Lab Results   Component Value Date    WBC 8.33 07/05/2024    HGB 11.9 (L) 07/05/2024    HCT 38.1 07/05/2024    MCV 96 07/05/2024     07/05/2024       CMP  Sodium   Date Value Ref Range Status   04/16/2025 140 136 - 145 mmol/L Final   07/05/2024 139 136 - 145 mmol/L Final   07/05/2024 139 136 - 145 mmol/L Final   12/10/2018 138 134 - 144 mmol/L      Potassium   Date Value Ref Range Status   04/16/2025 4.1 3.5 - 5.1 mmol/L Final   07/05/2024 3.9 3.5 - 5.1 mmol/L Final   07/05/2024 3.9 3.5 - 5.1 " mmol/L Final     Chloride   Date Value Ref Range Status   04/16/2025 105 95 - 110 mmol/L Final   07/05/2024 106 95 - 110 mmol/L Final   07/05/2024 106 95 - 110 mmol/L Final   12/10/2018 102 98 - 110 mmol/L      CO2   Date Value Ref Range Status   04/16/2025 25 23 - 29 mmol/L Final   07/05/2024 23 23 - 29 mmol/L Final   07/05/2024 23 23 - 29 mmol/L Final     Glucose   Date Value Ref Range Status   04/16/2025 93 70 - 110 mg/dL Final   07/05/2024 107 70 - 110 mg/dL Final   07/05/2024 107 70 - 110 mg/dL Final   12/10/2018 101 (H) 70 - 99 mg/dL      BUN   Date Value Ref Range Status   04/16/2025 14 8 - 23 mg/dL Final     Creatinine   Date Value Ref Range Status   04/16/2025 0.8 0.5 - 1.4 mg/dL Final   12/10/2018 0.71 0.60 - 1.40 mg/dL      Calcium   Date Value Ref Range Status   04/16/2025 9.7 8.7 - 10.5 mg/dL Final   07/05/2024 9.6 8.7 - 10.5 mg/dL Final   07/05/2024 9.6 8.7 - 10.5 mg/dL Final     Protein Total   Date Value Ref Range Status   04/16/2025 7.7 6.0 - 8.4 gm/dL Final     Total Protein   Date Value Ref Range Status   07/05/2024 7.5 6.0 - 8.4 g/dL Final     Albumin   Date Value Ref Range Status   04/16/2025 4.0 3.5 - 5.2 g/dL Final   07/05/2024 3.9 3.5 - 5.2 g/dL Final   12/10/2018 4.7 3.1 - 4.7 g/dL      Total Bilirubin   Date Value Ref Range Status   07/05/2024 0.3 0.1 - 1.0 mg/dL Final     Comment:     For infants and newborns, interpretation of results should be based  on gestational age, weight and in agreement with clinical  observations.    Premature Infant recommended reference ranges:  Up to 24 hours.............<8.0 mg/dL  Up to 48 hours............<12.0 mg/dL  3-5 days..................<15.0 mg/dL  6-29 days.................<15.0 mg/dL       Bilirubin Total   Date Value Ref Range Status   04/16/2025 0.4 0.1 - 1.0 mg/dL Final     Comment:     For infants and newborns, interpretation of results should be based   on gestational age, weight and in agreement with clinical   observations.    Premature  Infant recommended reference ranges:   0-24 hours:  <8.0 mg/dL   24-48 hours: <12.0 mg/dL   3-5 days:    <15.0 mg/dL   6-29 days:   <15.0 mg/dL     Alkaline Phosphatase   Date Value Ref Range Status   07/05/2024 96 55 - 135 U/L Final     ALP   Date Value Ref Range Status   04/16/2025 82 40 - 150 unit/L Final     AST   Date Value Ref Range Status   04/16/2025 21 11 - 45 unit/L Final   07/05/2024 24 10 - 40 U/L Final     ALT   Date Value Ref Range Status   04/16/2025 26 10 - 44 unit/L Final   07/05/2024 29 10 - 44 U/L Final     Anion Gap   Date Value Ref Range Status   04/16/2025 10 8 - 16 mmol/L Final     eGFR   Date Value Ref Range Status   04/16/2025 >60 >60 mL/min/1.73/m2 Final     Comment:     Estimated GFR calculated using the CKD-EPI creatinine (2021) equation.   07/05/2024 >60.0 >60 mL/min/1.73 m^2 Final   07/05/2024 >60.0 >60 mL/min/1.73 m^2 Final         PFT reviewed  12/30/24- normal spirometry and lung vol, moderate reduced dlco        Plan:  Clinical impression is apparently straight forward and impression with management as below.  Asthma/bronchiectasis, allergies stable no exacerbation    Problem List Items Addressed This Visit       Morbid obesity with BMI of 40.0-44.9, adult    Recurrent sinusitis    Mucopurulent chronic bronchitis    Bronchiectasis without complication - Primary    Relevant Medications    albuterol-budesonide (AIRSUPRA) 90-80 mcg/actuation     Other Visit Diagnoses         Mild persistent asthma without complication        Relevant Medications    montelukast (SINGULAIR) 10 mg tablet              Follow up in about 1 year (around 7/23/2026).    Discussed with patient above for education the following:      Patient Instructions   Try airsupra inhaler 1-2 puffs as needed  You may be allergic to carpet and dust    Singulair for asthma/allergies take one pill daily- black box warning depression/suicidality reviewed. If you have any concerning side effects call clinic and discontinue  use.

## 2025-07-23 NOTE — PATIENT INSTRUCTIONS
Try airsupra inhaler 1-2 puffs as needed  You may be allergic to carpet and dust    Singulair for asthma/allergies take one pill daily- black box warning depression/suicidality reviewed. If you have any concerning side effects call clinic and discontinue use.

## 2025-07-24 RX ORDER — ESTRADIOL 10 UG/1
TABLET, FILM COATED VAGINAL
Qty: 20 TABLET | Refills: 0 | Status: SHIPPED | OUTPATIENT
Start: 2025-07-24

## 2025-07-24 RX ORDER — LANCETS
1 EACH MISCELLANEOUS DAILY
Qty: 100 EACH | Refills: 1 | Status: SHIPPED | OUTPATIENT
Start: 2025-07-24

## 2025-07-24 NOTE — TELEPHONE ENCOUNTER
Patient is requesting a refill of the lancets and strips for the one touch.  I researched her chart and couldn't find where we ordered the lancets so the one I pended may be wrong please review before sending.

## 2025-07-25 ENCOUNTER — TELEPHONE (OUTPATIENT)
Dept: PULMONOLOGY | Facility: CLINIC | Age: 65
End: 2025-07-25
Payer: MEDICARE

## 2025-07-25 NOTE — TELEPHONE ENCOUNTER
Cover my meds #[id number]           Approved today by [employer] 2017  CaseId:[id number];Status:Approved;Review Type:Prior Auth;Coverage Start Date:06/25/2025;Coverage End Date:07/25/2026;  Effective Date: 6/25/2025  Authorization Expiration Date: 7/25/2026

## 2025-07-29 ENCOUNTER — PATIENT MESSAGE (OUTPATIENT)
Dept: FAMILY MEDICINE | Facility: CLINIC | Age: 65
End: 2025-07-29
Payer: MEDICARE

## 2025-07-29 DIAGNOSIS — N95.1 VASOMOTOR SYMPTOMS DUE TO MENOPAUSE: ICD-10-CM

## 2025-07-29 NOTE — TELEPHONE ENCOUNTER
Called the pharmacy and they are stating that the estradiol needs a prior authorization submitting prior auth

## 2025-07-31 RX ORDER — ESTRADIOL 10 UG/1
TABLET, FILM COATED VAGINAL
Qty: 34 TABLET | Refills: 0 | Status: SHIPPED | OUTPATIENT
Start: 2025-07-31

## 2025-08-05 ENCOUNTER — HOSPITAL ENCOUNTER (OUTPATIENT)
Dept: RADIOLOGY | Facility: HOSPITAL | Age: 65
Discharge: HOME | End: 2025-08-05
Attending: NURSE PRACTITIONER
Payer: MEDICARE

## 2025-08-05 DIAGNOSIS — E04.2 MULTINODULAR GOITER: ICD-10-CM

## 2025-08-05 DIAGNOSIS — Z12.31 ENCOUNTER FOR SCREENING MAMMOGRAM FOR MALIGNANT NEOPLASM OF BREAST: ICD-10-CM

## 2025-08-05 PROCEDURE — 77063 BREAST TOMOSYNTHESIS BI: CPT | Mod: 26,,, | Performed by: RADIOLOGY

## 2025-08-05 PROCEDURE — 77067 SCR MAMMO BI INCL CAD: CPT | Mod: TC,PO

## 2025-08-05 PROCEDURE — 76536 US EXAM OF HEAD AND NECK: CPT | Mod: 26,,, | Performed by: RADIOLOGY

## 2025-08-05 PROCEDURE — 77067 SCR MAMMO BI INCL CAD: CPT | Mod: 26,,, | Performed by: RADIOLOGY

## 2025-08-05 PROCEDURE — 76536 US EXAM OF HEAD AND NECK: CPT | Mod: TC,PO

## 2025-08-12 ENCOUNTER — PATIENT OUTREACH (OUTPATIENT)
Dept: ADMINISTRATIVE | Facility: HOSPITAL | Age: 65
End: 2025-08-12
Payer: MEDICARE

## 2025-08-13 ENCOUNTER — PATIENT OUTREACH (OUTPATIENT)
Dept: ADMINISTRATIVE | Facility: HOSPITAL | Age: 65
End: 2025-08-13
Payer: MEDICARE

## 2025-08-23 DIAGNOSIS — E89.0 POSTOPERATIVE HYPOTHYROIDISM: Primary | ICD-10-CM

## 2025-08-23 DIAGNOSIS — K64.9 HEMORRHOIDS, UNSPECIFIED HEMORRHOID TYPE: ICD-10-CM

## 2025-08-23 DIAGNOSIS — E11.9 TYPE 2 DIABETES MELLITUS WITHOUT COMPLICATION, WITHOUT LONG-TERM CURRENT USE OF INSULIN: ICD-10-CM

## 2025-08-23 DIAGNOSIS — Z91.09 ENVIRONMENTAL ALLERGIES: ICD-10-CM

## 2025-08-25 RX ORDER — LEVOTHYROXINE SODIUM 100 UG/1
100 TABLET ORAL
Qty: 90 TABLET | Refills: 3 | Status: SHIPPED | OUTPATIENT
Start: 2025-08-25

## 2025-08-26 RX ORDER — AZELASTINE 1 MG/ML
1 SPRAY, METERED NASAL 2 TIMES DAILY
Qty: 30 ML | Refills: 5 | Status: SHIPPED | OUTPATIENT
Start: 2025-08-26

## 2025-08-26 RX ORDER — FLUTICASONE PROPIONATE 50 MCG
SPRAY, SUSPENSION (ML) NASAL
Qty: 16 G | Refills: 5 | Status: SHIPPED | OUTPATIENT
Start: 2025-08-26

## 2025-08-26 RX ORDER — HYDROCORTISONE 25 MG/G
CREAM TOPICAL 2 TIMES DAILY
Qty: 28 G | Refills: 0 | Status: SHIPPED | OUTPATIENT
Start: 2025-08-26

## (undated) DEVICE — APPLIER CLIP LIAGCLIP 9.375IN

## (undated) DEVICE — SUT 2-0 12-18IN SILK

## (undated) DEVICE — ELECTRODE NDL EDGE 2 5/6IN

## (undated) DEVICE — ELECTRODE REM PLYHSV RETURN 9

## (undated) DEVICE — GLOVE BIOGEL PI MICRO SZ 7.5

## (undated) DEVICE — SUT ETHILON 4-0 PS2 18 BLK

## (undated) DEVICE — DISSECTOR LIGASURE EXACT 21CM

## (undated) DEVICE — SUT 4-0 12-18IN SILK BLACK

## (undated) DEVICE — DRAPE INSTR MAGNETIC 10X16IN

## (undated) DEVICE — SUT MCRYL PLUS 4-0 PS2 27IN

## (undated) DEVICE — ADHESIVE DERMABOND MINI HV

## (undated) DEVICE — APPLIER LIGACLIP SM 9.38IN

## (undated) DEVICE — HEMOSTAT SURGICEL 4X8IN

## (undated) DEVICE — TRAY GENERAL SURGERY SMH

## (undated) DEVICE — SUT 3-0 12-18IN SILK

## (undated) DEVICE — SUT SILK 3-0 SH 18IN BLACK

## (undated) DEVICE — SUT MONOCRYL 4-0 SH UND MON

## (undated) DEVICE — BLADE SURG #15 CARBON STEEL

## (undated) DEVICE — DRAPE THYROID SOFT STERILE

## (undated) DEVICE — SPONGE X-RAY DETCT .375IN

## (undated) DEVICE — GLOVE BIOGEL PI MICRO INDIC 8

## (undated) DEVICE — SPONGE LAP STRL 18X18